# Patient Record
Sex: FEMALE | Race: WHITE
[De-identification: names, ages, dates, MRNs, and addresses within clinical notes are randomized per-mention and may not be internally consistent; named-entity substitution may affect disease eponyms.]

---

## 2020-08-09 ENCOUNTER — HOSPITAL ENCOUNTER (INPATIENT)
Dept: HOSPITAL 74 - FER | Age: 55
LOS: 4 days | Discharge: HOME | DRG: 313 | End: 2020-08-13
Attending: NURSE PRACTITIONER | Admitting: STUDENT IN AN ORGANIZED HEALTH CARE EDUCATION/TRAINING PROGRAM
Payer: COMMERCIAL

## 2020-08-09 VITALS — BODY MASS INDEX: 25.2 KG/M2

## 2020-08-09 DIAGNOSIS — D64.9: ICD-10-CM

## 2020-08-09 DIAGNOSIS — E87.1: ICD-10-CM

## 2020-08-09 DIAGNOSIS — E11.42: ICD-10-CM
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
## 2020-08-09 DIAGNOSIS — E83.42: ICD-10-CM

## 2020-08-09 DIAGNOSIS — F41.8: ICD-10-CM

## 2020-08-09 DIAGNOSIS — E11.9: ICD-10-CM
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
## 2020-08-09 DIAGNOSIS — R55: ICD-10-CM

## 2020-08-09 DIAGNOSIS — I95.1: ICD-10-CM

## 2020-08-09 DIAGNOSIS — I10: ICD-10-CM

## 2020-08-09 DIAGNOSIS — S82.842A: Primary | ICD-10-CM

## 2020-08-09 DIAGNOSIS — E03.9: ICD-10-CM
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
## 2020-08-09 DIAGNOSIS — M06.9: ICD-10-CM

## 2020-08-09 LAB
ALBUMIN SERPL-MCNC: 3.8 G/DL (ref 3.4–5)
ALP SERPL-CCNC: 61 U/L (ref 45–117)
ALT SERPL-CCNC: 14 U/L (ref 13–61)
ANION GAP SERPL CALC-SCNC: 12 MMOL/L (ref 8–16)
AST SERPL-CCNC: 24 U/L (ref 15–37)
BASOPHILS # BLD: 0.6 % (ref 0–2)
BILIRUB SERPL-MCNC: 0.6 MG/DL (ref 0.2–1)
BUN SERPL-MCNC: 20 MG/DL (ref 7–18)
CALCIUM SERPL-MCNC: 9.2 MG/DL (ref 8.5–10)
CHLORIDE SERPL-SCNC: 97 MMOL/L (ref 98–107)
CO2 SERPL-SCNC: 25 MMOL/L (ref 21–32)
CREAT SERPL-MCNC: 1.2 MG/DL (ref 0.55–1.3)
DEPRECATED RDW RBC AUTO: 13 % (ref 11.6–15.6)
EOSINOPHIL # BLD: 4.4 % (ref 0–4.5)
EPITH CASTS URNS QL MICRO: (no result) /HPF
GLUCOSE SERPL-MCNC: 158 MG/DL (ref 74–106)
HCT VFR BLD CALC: 31.1 % (ref 32.4–45.2)
HGB BLD-MCNC: 10.5 GM/DL (ref 10.7–15.3)
HYALINE CASTS URNS QL MICRO: (no result) /LPF
LYMPHOCYTES # BLD: 14.5 % (ref 8–40)
MCH RBC QN AUTO: 32.6 PG (ref 25.7–33.7)
MCHC RBC AUTO-ENTMCNC: 33.7 G/DL (ref 32–36)
MCV RBC: 96.7 FL (ref 80–96)
MONOCYTES # BLD AUTO: 8.1 % (ref 3.8–10.2)
NEUTROPHILS # BLD: 72.4 % (ref 42.8–82.8)
PLATELET # BLD AUTO: 211 K/MM3 (ref 134–434)
PMV BLD: 9.4 FL (ref 7.5–11.1)
POTASSIUM SERPLBLD-SCNC: 4.7 MMOL/L (ref 3.5–5.1)
PROT SERPL-MCNC: 6.7 G/DL (ref 6.4–8.2)
RBC # BLD AUTO: 3.22 M/MM3 (ref 3.6–5.2)
SODIUM SERPL-SCNC: 134 MMOL/L (ref 136–145)
WBC # BLD AUTO: 5.9 K/MM3 (ref 4–10.8)

## 2020-08-09 PROCEDURE — U0003 INFECTIOUS AGENT DETECTION BY NUCLEIC ACID (DNA OR RNA); SEVERE ACUTE RESPIRATORY SYNDROME CORONAVIRUS 2 (SARS-COV-2) (CORONAVIRUS DISEASE [COVID-19]), AMPLIFIED PROBE TECHNIQUE, MAKING USE OF HIGH THROUGHPUT TECHNOLOGIES AS DESCRIBED BY CMS-2020-01-R: HCPCS

## 2020-08-09 RX ADMIN — ACETAMINOPHEN PRN MG: 10 INJECTION, SOLUTION INTRAVENOUS at 21:33

## 2020-08-09 RX ADMIN — SERTRALINE HYDROCHLORIDE SCH MG: 50 TABLET ORAL at 21:19

## 2020-08-09 RX ADMIN — TRAZODONE HYDROCHLORIDE SCH MG: 50 TABLET ORAL at 21:18

## 2020-08-09 RX ADMIN — INSULIN ASPART SCH: 100 INJECTION, SOLUTION INTRAVENOUS; SUBCUTANEOUS at 21:33

## 2020-08-09 NOTE — HP
CHIEF COMPLAINT: Fall/Lightheadedness


PCP:





HISTORY OF PRESENT ILLNESS:


   53yo F with h/o anxiety/depression, T1DM, RA (weekly methotrexate) who 

presented via EMS due to fall. Patient reports feeling lightheaded while 

standing and felt her legs give out. She did not lose consciousness. She fell 

while on the way to the bathroom in the hallway. Patient could not stand and 

called EMS. 3 weeks prior patient was started on GLP-1 agonist for her DM and 

noted she has been lightheaded since this new medication was started. Pt's 

glucose here is 157. She reports she takes her glucose levels regularly with her

lowest reading in the past weeks as 70. Pt told her endocrinologist, Dr. Estrella, 

about her symptoms and reports that the endocrinologist was about to hold her 

next few doses of medications. Patient takes ASA regularly for which she took 

yesterday. 





Currently pt denies any fever/chills, headaches, SOB, CP, palpitations, 

abdominal pain, dysuria, polyuria, hematuria, hematochezia, diarrhea, n/v, 

numbness or sensation changes in lower extremities. Pt endorses loss of appetite

with new medication. Drinks water regularly





FamHx: DM; noncontributory





PAST MEDICAL HISTORY:


   As above





PAST SURGICAL HISTORY:


   Tonsillectomy





Social History:


Smoking: Denies


Alcohol: Denies


Drugs: Denies





Allergies





No Known Allergies Allergy (Unverified 08/09/20 15:14)


   








HOME MEDICATIONS:


                                Home Medications











 Medication  Instructions  Recorded


 


Aripiprazole [Abilify] 5 mg PO DAILY 08/09/20


 


Aspirin Coated [Ecotrin -] 81 mg PO DAILY 08/09/20


 


Bupropion HCl [Wellbutrin Xl -] 150 mg PO HS 08/09/20


 


Folic Acid 1 mg PO DAILY 08/09/20


 


Insulin (LOG) Aspart [NovoLOG -] 0 units SQ QID 08/09/20


 


Insulin Pump [Insulin Pump - (Nf)] 1 each SQ ASDIR 08/09/20


 


Levothyroxine [Synthroid -] 112 mcg PO DAILY 08/09/20


 


Lisinopril [Zestril] 2.5 mg PO DAILY 08/09/20


 


Methotrexate Sodium/Pf 25 mg IJ WEEKLY 08/09/20





[Methotrexate 250 mg/10 ml Vial]  


 


Metoprolol Succinate [Toprol Xl] 50 mg PO DAILY 08/09/20


 


Semaglutide [Ozempic] 0.5 mg SQ WEEKLY 08/09/20


 


Sertraline HCl [Zoloft] 200 mg PO HS 08/09/20


 


Simvastatin 40 mg PO HS 08/09/20


 


Trazodone HCl 75 mg PO HS 08/09/20


 


traZODone HCL [Trazodone HCl] 75 mg PO HS 08/09/20








REVIEW OF SYSTEMS


As per HPI








PHYSICAL EXAMINATION


                               Vital Signs - 24 hr











  08/09/20





  15:06


 


Temperature 98.3 F


 


Pulse Rate 87


 


Respiratory 15





Rate 


 


Blood Pressure 106/72


 


O2 Sat by Pulse 96





Oximetry (%) 











GENERAL: Awake, alert, and fully oriented, in no acute distress.


HEENT: Nc/AT, EOMI, SAJAN, sclera anicteric, MMM


NECK: No JVD, no TTP to C-spine


LUNGS: CTA bilaterally. No wheezes, and no crackles. No accessory muscle use.


HEART: RRR, normal S1 and S2 without murmur


ABDOMEN: Soft, NT/ND, normoactive bowel sounds, no guarding, 


MUSCULOSKELETAL: No gross deformity of ankle. In air splint. Toe ROM intact with

limitation due to pain. No knee effusions


EXTREMITIES: 2+ DP pulses b/l, warm, well-perfused. No calf tenderness. R ankle 

edema.


NEUROLOGICAL:  Cranial nerves II-XII intact. Normal speech. Sensation intact in 

R foot in all areas


PSYCHIATRIC: Cooperative. Good eye contact. Appropriate mood and affect.


SKIN: Warm, dry, no rashes or lesions noted 


                         Laboratory Results - last 24 hr











  08/09/20 08/09/20 08/09/20





  15:50 15:50 15:50


 


WBC  5.9  


 


RBC  3.22 L  


 


Hgb  10.5 L  


 


Hct  31.1 L  


 


MCV  96.7 H  


 


MCH  32.6  


 


MCHC  33.7  


 


RDW  13.0  


 


Plt Count  211  


 


MPV  9.4  


 


Absolute Neuts (auto)  4.2  


 


Neutrophils %  72.4  


 


Lymphocytes %  14.5  


 


Monocytes %  8.1  


 


Eosinophils %  4.4  


 


Basophils %  0.6  


 


Sodium   134 L 


 


Potassium   4.7 


 


Chloride   97 L 


 


Carbon Dioxide   25 


 


Anion Gap   12 


 


BUN   20.0 H 


 


Creatinine   1.2 


 


Est GFR (CKD-EPI)AfAm   58.51 


 


Est GFR (CKD-EPI)NonAf   50.48 


 


Random Glucose   158 H 


 


Calcium   9.2 


 


Total Bilirubin   0.6 


 


AST   24 


 


ALT   14 


 


Alkaline Phosphatase   61 


 


Creatine Kinase   71 


 


Troponin I    < 0.03


 


Total Protein   6.7 


 


Albumin   3.8 


 


Urine Color   


 


Urine Appearance   


 


Urine pH   


 


Urine Protein   


 


Urine Glucose (UA)   


 


Urine Ketones   


 


Urine Blood   


 


Urine Nitrite   


 


Urine Bilirubin   


 


Urine Urobilinogen   


 


Ur Leukocyte Esterase   














  08/09/20





  17:20


 


WBC 


 


RBC 


 


Hgb 


 


Hct 


 


MCV 


 


MCH 


 


MCHC 


 


RDW 


 


Plt Count 


 


MPV 


 


Absolute Neuts (auto) 


 


Neutrophils % 


 


Lymphocytes % 


 


Monocytes % 


 


Eosinophils % 


 


Basophils % 


 


Sodium 


 


Potassium 


 


Chloride 


 


Carbon Dioxide 


 


Anion Gap 


 


BUN 


 


Creatinine 


 


Est GFR (CKD-EPI)AfAm 


 


Est GFR (CKD-EPI)NonAf 


 


Random Glucose 


 


Calcium 


 


Total Bilirubin 


 


AST 


 


ALT 


 


Alkaline Phosphatase 


 


Creatine Kinase 


 


Troponin I 


 


Total Protein 


 


Albumin 


 


Urine Color  Yellow


 


Urine Appearance  Clear


 


Urine pH  6.5


 


Urine Protein  Negative


 


Urine Glucose (UA)  Negative


 


Urine Ketones  Negative


 


Urine Blood  Negative


 


Urine Nitrite  Negative


 


Urine Bilirubin  Negative


 


Urine Urobilinogen  0.2


 


Ur Leukocyte Esterase  Trace H











ASSESSMENT/PLAN:


Syncope vs. Pre-syncope


L Bimaleollar ankle fracture


T1DM


Hypothyroidism


CAD without intervention





--Likely presyncope related to medication


   --Suspect patient is becoming hypoglycemic during these episodes


--Telemetry monitoring for aberrant rhythm


--BGM ACHS with ISS coverage; patient may still be under effects of weekly GLP-1


   --D50 amp PRN for hypoglycemia


--Dr. Rebolledo consulted in ED


   --Probably surgery, but will come to assess


--Type and screen, coags for AM


--Tylenol and morphine for PRN pain control





--Continue home medications as below:


Aripiprazole (Abilify)  5 mg PO DAILY MANISH


Levothyroxine Sodium (Synthroid -)  112 mcg PO ACBK MANISH


Lisinopril (Prinivil)  2.5 mg PO DAILY MANISH


Metoprolol Succinate (Toprol Xl -)  50 mg PO DAILY MANISH


Non-Formulary Medication (Sertraline Hcl [Zoloft])  200 mg PO HS MANISH


Trazodone HCl (Desyrel -)  75 mg PO HS MANISH





diet: Diabetic/sodium


PPX: SCD only R LEG





Dispo: Admit M/S


Isauro Mcclendon, DO - IM











Visit type





- Emergency Visit


Emergency Visit: Yes


ED Registration Date: 08/09/20


Care time: The patient presented to the Emergency Department on the above date 

and was hospitalized for further evaluation of their emergent condition.





- New Patient


This patient is new to me today: Yes


Date on this admission: 08/09/20





- Critical Care


Critical Care patient: No

## 2020-08-09 NOTE — PDOC
Documentation entered by Salo Camacho SCRIBE, acting as scribe for 

Pablito Gonzalez MD.








Pablito Gonzalez MD:  This documentation has been prepared by the Doug purcell Angel, SCRIBE, under my direction and personally reviewed by me in its 

entirety.  I confirm that the documentation accurately reflects all work, 

treatment, procedures, and medical decision making performed by me.  





History of Present Illness





- General


Chief Complaint: Injury


Stated Complaint: L ANKLE INJURY, LIGHHEADED


Time Seen by Provider: 08/09/20 15:12


History Source: Patient


Exam Limitations: No Limitations





- History of Present Illness


Initial Comments: 





08/09/20 15:33


The patient is a 54 year old female with a significant past medical history of 

anxiety, depression, diabetes (insulin dependant), and rheumatoid arthritis who 

presents to the ED BIBA s/p fall. The patient states she stood up from a sitting

position and began feeling lightheaded on her way to the bathroom. Eventually 

her legs gave out and she fell in the hallway of her house. The patient states 3

weeks ago she began taking a new medication for her diabetes which she takes 

once a week called Ozempic and her lightheadedness began a week after starting 

the medication. The patient comes into the ED mainly complaining of left ankle 

pain/swelling. The patient denies any head trauma, LOC or any other injuries. 





Medications: Ozempic once a week injection, Methotrexate once a week injection. 





Allergies: NKDA








08/09/20 17:35


Alert and oriented well-developed well-nourished mild distress due to left ankle

pain cooperative


Afebrile, vital signs stable


HEENT normal


Neck supple without bruit mass or nodes.  No tenderness or deformity.  Full 

range of motion without pain


Chest clear, full breath sounds bilaterally, no rib cage or chest wall deformity

or tenderness


CV S1-S2 normal without murmur rub or gallop.  Regular 80


Abdomen soft nontender without mass organomegaly.  No CVAT


Neurological C2 to 12 intact.  Strength full and symmetric.  No focal 

sensorimotor deficits.  Unable to ambulate due to ankle pain


No vertigo with head movement or change of position.  No nystagmus


Left ankle: Mild to moderate swelling both medially and laterally.  Point 

tenderness both medial and lateral malleoli.  No significant deformity.  No 

instability.


Left foot: Pulses are not palpable in either foot, though both posterior tibial 

pulses are well heard with Doppler.  No distal sensory deficits.  No diabetic 

changes.





Impression: Bimalleolar ankle fracture, dizziness with questionable syncope that

seems to be temporally related to beginning her new diabetes medication 3 weeks 

ago.





Plan: CT is negative for acute neurological event.  EKG and enzymes show no 

evidence of acute cardiac event.  Glucose is controlled at 158.  Definitive 

treatment of the fracture may require ORIF.  Hospitalist and orthopedic surgeon 

has been contacted.  Patient remains clinically and hemodynamically stable.





Past History





- Medical History


Allergies/Adverse Reactions: 


                                    Allergies











Allergy/AdvReac Type Severity Reaction Status Date / Time


 


No Known Allergies Allergy   Unverified 08/09/20 15:14











Home Medications: 


Ambulatory Orders





Abilify PO DAILY 08/09/20 


Bupropion HCl [Wellbutrin Xl -] 150 mg PO HS 08/09/20 


Folic Acid - PO DAILY 08/09/20 


Insulin (LOG) Aspart [NovoLOG -] 0 units SQ QID 08/09/20 


Insulin Pump [Insulin Pump - (Nf)] 1 each SQ ASDIR 08/09/20 


Methotrexate WEEKLY 08/09/20 


Metoprolol Succinate [Toprol Xl] 50 mg PO DAILY 08/09/20 


Ozempic WEEKLY 08/09/20 


Sertraline HCl [Zoloft] 100 mg PO HS 08/09/20 


Simvastatin 20 mg PO HS 08/09/20 


traZODone HCL [Trazodone HCl] 75 mg PO HS 08/09/20 








COPD: No


Diabetes: Yes


HTN: Yes


Hypercholesterolemia: Yes


Psychiatric Problems: Yes





- Psycho-Social/Smoking History


Smoking History: Never smoked





- Substance Abuse Hx (Audit-C & DAST Scrn)


How often the patient has a drink containing alcohol: Never


Score: In Men: 4 or > Positive; In Women: 3 or > Positive: 0


Screen Result (Pos requires Nsg. Audit-10AR): Negative


In the last yr the pt used illegal drug/Rx for NonMed reason: No


Score:  Yes response is considered Positive: 0


Screen Result (Positive result requires Nsg. DAST-10): Negative





**Review of Systems





- Review of Systems


Able to Perform ROS?: Yes


Comments:: 





08/09/20 15:33


GENERAL/CONSTITUTIONAL: No fever or chills. No weakness.


HEAD, EYES, EARS, NOSE AND THROAT: No change in vision. No ear pain or 

discharge. No sore throat.


CARDIOVASCULAR: No chest pain or shortness of breath.


RESPIRATORY: No cough, wheezing, or hemoptysis.


GASTROINTESTINAL: No nausea, vomiting, diarrhea or constipation.


GENITOURINARY: No dysuria, frequency, or change in urination.


MUSCULOSKELETAL: +Left ankle pain/swelling. No neck or back pain.


SKIN: No rash


NEUROLOGIC: +Lightheadedness. No loss of consciousness, or change in 

strength/sensation.


ENDOCRINE: No increased thirst. No abnormal weight change.


HEMATOLOGIC/LYMPHATIC: No anemia, easy bleeding, or history of blood clots.


ALLERGIC/IMMUNOLOGIC: No hives or skin allergy.








*Physical Exam





- Vital Signs


                                Last Vital Signs











Temp Pulse Resp BP Pulse Ox


 


 98.3 F   87   15   106/72   96 


 


 08/09/20 15:06  08/09/20 15:06  08/09/20 15:06  08/09/20 15:06  08/09/20 15:06














ED Treatment Course





- LABORATORY


CBC & Chemistry Diagram: 


                                 08/09/20 15:50





                                 08/09/20 15:50





Medical Decision Making





- Medical Decision Making





08/09/20 15:48


EKG shows normal sinus rhythm rate 82/min.  Normal axis and intervals.  Some 

artifact is present in the lateral leads, but there appears to be no ST-T wave 

changes suggestive of ischemia.  Normal EKG.


08/09/20 15:49


CT of the brain shows no acute hemorrhage, stroke, or other significant 

abnormality.  There is an area of what appears to be chronic encephalomalacia.


08/09/20 17:31


X-ray of the left ankle was reviewed.  There is an oblique fracture of the 

distal fibula as well as a minimally displaced fracture of the medial malleolus.

 Also visualized is a possible small avulsion from the talus, and a questionable

disruption of the cortex of the posterior malleolus.





Dr. Rebolledo was contacted by phone.  The fracture was described.  He recommended 

splinting ice and elevation.  He will consult on the patient in the hospital.  

Surgery may be required.





Laboratory evaluation did not show any significant abnormalities.  Glucose was 

158.  There were no electrolyte abnormalities.  Cardiac enzymes were negative.


08/09/20 17:34


[facility] hospitalist was contacted for admission for further work-up of 

dizziness/syncope and definitive treatment of the ankle injury.  Dr. Mcclendon 

accepts the patient for admission.


08/09/20 17:35


A stirrup splint was applied to the ankle, the patient being more comfortable.  

There was no distal numbness tingling or pain.  Pulses remained intact with 

Doppler.





Discharge





- Discharge Information


Problems reviewed: Yes


Clinical Impression/Diagnosis: 


Syncope


Qualifiers:


 Syncope type: unspecified Qualified Code(s): R55 - Syncope and collapse





Bimalleolar fracture of left ankle


Qualifiers:


 Encounter type: initial encounter Fracture type: closed Qualified Code(s): 

S82.842A - Displaced bimalleolar fracture of left lower leg, initial encounter 

for closed fracture





Condition: Good





- Admission


Yes





- Follow up/Referral


Referrals: 


Briana Claros MD [Primary Care Provider] - 





- Patient Discharge Instructions





- Post Discharge Activity

## 2020-08-09 NOTE — PDOC
History of Present Illness





- General


Chief Complaint: Injury


Stated Complaint: L ANKLE INJURY, LIGHHEADED


Time Seen by Provider: 08/09/20 15:12





Discharge





- Discharge Information


Condition: Good





- Follow up/Referral


Referrals: 


Briana Claros MD [Primary Care Provider] - 





- Patient Discharge Instructions





- Post Discharge Activity

## 2020-08-10 LAB
ANION GAP SERPL CALC-SCNC: 10 MMOL/L (ref 8–16)
BUN SERPL-MCNC: 16 MG/DL (ref 7–18)
CALCIUM SERPL-MCNC: 8.7 MG/DL (ref 8.5–10)
CHLORIDE SERPL-SCNC: 96 MMOL/L (ref 98–107)
CO2 SERPL-SCNC: 24 MMOL/L (ref 21–32)
CREAT SERPL-MCNC: 1.1 MG/DL (ref 0.55–1.3)
DEPRECATED RDW RBC AUTO: 12.7 % (ref 11.6–15.6)
GLUCOSE SERPL-MCNC: 307 MG/DL (ref 74–106)
HCT VFR BLD CALC: 29.3 % (ref 32.4–45.2)
HGB BLD-MCNC: 9.5 GM/DL (ref 10.7–15.3)
INR BLD: 1.17 (ref 0.82–1.09)
MCH RBC QN AUTO: 32.4 PG (ref 25.7–33.7)
MCHC RBC AUTO-ENTMCNC: 32.5 G/DL (ref 32–36)
MCV RBC: 99.6 FL (ref 80–96)
PLATELET # BLD AUTO: 184 K/MM3 (ref 134–434)
PMV BLD: 9.7 FL (ref 7.5–11.1)
POTASSIUM SERPLBLD-SCNC: 4.6 MMOL/L (ref 3.5–5.1)
PT PNL PPP: 13.1 SEC (ref 10.2–13)
RBC # BLD AUTO: 2.94 M/MM3 (ref 3.6–5.2)
SODIUM SERPL-SCNC: 130 MMOL/L (ref 136–145)
WBC # BLD AUTO: 5.5 K/MM3 (ref 4–10.8)

## 2020-08-10 RX ADMIN — LISINOPRIL SCH MG: 5 TABLET ORAL at 10:45

## 2020-08-10 RX ADMIN — INSULIN ASPART SCH: 100 INJECTION, SOLUTION INTRAVENOUS; SUBCUTANEOUS at 06:34

## 2020-08-10 RX ADMIN — FOLIC ACID SCH MG: 1 TABLET ORAL at 17:58

## 2020-08-10 RX ADMIN — TRAZODONE HYDROCHLORIDE SCH MG: 50 TABLET ORAL at 21:34

## 2020-08-10 RX ADMIN — SERTRALINE HYDROCHLORIDE SCH MG: 50 TABLET ORAL at 21:34

## 2020-08-10 RX ADMIN — MORPHINE SULFATE PRN MG: 2 INJECTION, SOLUTION INTRAMUSCULAR; INTRAVENOUS at 21:35

## 2020-08-10 RX ADMIN — INSULIN ASPART SCH: 100 INJECTION, SOLUTION INTRAVENOUS; SUBCUTANEOUS at 22:54

## 2020-08-10 RX ADMIN — LEVOTHYROXINE SODIUM SCH MCG: 112 TABLET ORAL at 06:35

## 2020-08-10 RX ADMIN — INSULIN ASPART SCH: 100 INJECTION, SOLUTION INTRAVENOUS; SUBCUTANEOUS at 17:51

## 2020-08-10 RX ADMIN — INSULIN ASPART SCH: 100 INJECTION, SOLUTION INTRAVENOUS; SUBCUTANEOUS at 11:43

## 2020-08-10 RX ADMIN — ACETAMINOPHEN PRN MG: 10 INJECTION, SOLUTION INTRAVENOUS at 05:49

## 2020-08-10 RX ADMIN — ATORVASTATIN CALCIUM SCH MG: 20 TABLET, FILM COATED ORAL at 21:34

## 2020-08-10 NOTE — EKG
Test Reason : 

Blood Pressure : ***/*** mmHG

Vent. Rate : 082 BPM     Atrial Rate : 082 BPM

   P-R Int : 180 ms          QRS Dur : 094 ms

    QT Int : 346 ms       P-R-T Axes : 052 041 026 degrees

   QTc Int : 404 ms

 

NORMAL SINUS RHYTHM

NORMAL ECG

NO PREVIOUS ECGS AVAILABLE

Confirmed by Jorge Earl ([id number]) on 8/10/2020 9:22:02 AM

 

Referred By:             Confirmed By:Jorge Earl

## 2020-08-10 NOTE — PN
Physical Exam: 


SUBJECTIVE: Patient seen and examined. Pain in left foot and ankle, gets good 

relief with morphine.  








OBJECTIVE:





                                   Vital Signs











 Period  Temp  Pulse  Resp  BP Sys/Elias  Pulse Ox


 


 Last 24 Hr  98 F-99.1 F  83-96  16-20  /45-67  94-99











GENERAL: The patient is awake, alert, and fully oriented, in no acute distress.


LUNGS: Breath sounds equal, clear to auscultation bilaterally, no wheezes, no 

crackles, no 


accessory muscle use. 


HEART: Regular rate and rhythm, S1, S2 


ABDOMEN: Soft, nontender, nondistended,


LLE: foot in stirrup device, + pulses, warm, well-perfused, + edema


NEUROLOGICAL: Cranial nerves II through XII grossly intact. Normal speech, gait 

not observed.














                         Laboratory Results - last 24 hr











  08/09/20 08/09/20 08/09/20





  17:00 17:20 21:16


 


WBC   


 


RBC   


 


Hgb   


 


Hct   


 


MCV   


 


MCH   


 


MCHC   


 


RDW   


 


Plt Count   


 


MPV   


 


PT with INR  Cancelled  


 


INR  Cancelled  


 


Sodium   


 


Potassium   


 


Chloride   


 


Carbon Dioxide   


 


Anion Gap   


 


BUN   


 


Creatinine   


 


Est GFR (CKD-EPI)AfAm   


 


Est GFR (CKD-EPI)NonAf   


 


POC Glucometer    160


 


Random Glucose   


 


Calcium   


 


Urine Color   Yellow 


 


Urine Appearance   Clear 


 


Urine pH   6.5 


 


Urine Protein   Negative 


 


Urine Glucose (UA)   Negative 


 


Urine Ketones   Negative 


 


Urine Blood   Negative 


 


Urine Nitrite   Negative 


 


Urine Bilirubin   Negative 


 


Urine Urobilinogen   0.2 


 


Ur Leukocyte Esterase   Trace H 


 


Urine RBC   0-2 


 


Urine WBC   2-5 


 


Ur Transition Epith Cell   Few 


 


Hyaline Casts   1-3 


 


Blood Type   


 


Antibody Screen   














  08/10/20 08/10/20 08/10/20





  06:00 06:00 06:25


 


WBC   


 


RBC   


 


Hgb   


 


Hct   


 


MCV   


 


MCH   


 


MCHC   


 


RDW   


 


Plt Count   


 


MPV   


 


PT with INR   13.1 H 


 


INR   1.17 


 


Sodium  130 L  


 


Potassium  4.6  


 


Chloride  96 L  


 


Carbon Dioxide  24  


 


Anion Gap  10  


 


BUN  16.0  


 


Creatinine  1.1  


 


Est GFR (CKD-EPI)AfAm  65.00  


 


Est GFR (CKD-EPI)NonAf  56.08  


 


POC Glucometer    345


 


Random Glucose  307 H  


 


Calcium  8.7  


 


Urine Color   


 


Urine Appearance   


 


Urine pH   


 


Urine Protein   


 


Urine Glucose (UA)   


 


Urine Ketones   


 


Urine Blood   


 


Urine Nitrite   


 


Urine Bilirubin   


 


Urine Urobilinogen   


 


Ur Leukocyte Esterase   


 


Urine RBC   


 


Urine WBC   


 


Ur Transition Epith Cell   


 


Hyaline Casts   


 


Blood Type   


 


Antibody Screen   














  08/10/20 08/10/20





  07:14 07:17


 


WBC   5.5


 


RBC   2.94 L


 


Hgb   9.5 L


 


Hct   29.3 L


 


MCV   99.6 H


 


MCH   32.4


 


MCHC   32.5


 


RDW   12.7


 


Plt Count   184


 


MPV   9.7


 


PT with INR  


 


INR  


 


Sodium  


 


Potassium  


 


Chloride  


 


Carbon Dioxide  


 


Anion Gap  


 


BUN  


 


Creatinine  


 


Est GFR (CKD-EPI)AfAm  


 


Est GFR (CKD-EPI)NonAf  


 


POC Glucometer  


 


Random Glucose  


 


Calcium  


 


Urine Color  


 


Urine Appearance  


 


Urine pH  


 


Urine Protein  


 


Urine Glucose (UA)  


 


Urine Ketones  


 


Urine Blood  


 


Urine Nitrite  


 


Urine Bilirubin  


 


Urine Urobilinogen  


 


Ur Leukocyte Esterase  


 


Urine RBC  


 


Urine WBC  


 


Ur Transition Epith Cell  


 


Hyaline Casts  


 


Blood Type  A POSITIVE 


 


Antibody Screen  Negative 








                           Active Medications











Generic Name Dose Route Start Last Admin





  Trade Name Freq  PRN Reason Stop Dose Admin


 


Acetaminophen  1,000 mg  08/09/20 17:55  08/10/20 05:49





  Ofirmev Injection -  IVPB  08/10/20 17:55  1,000 mg





  Q6H PRN   Administration





  PAIN LEVEL 4 - 6  


 


Aripiprazole  5 mg  08/10/20 10:00  08/10/20 10:45





  Abilify  PO   5 mg





  DAILY MANISH   Administration


 


Dextrose  25 gm  08/09/20 18:26 





  D50w (Vial) -  IVPUSH  





  PRN PRN  





  HYPOGLYCEMIA  


 


Insulin Aspart  1 vial  08/09/20 22:00  08/10/20 11:43





  Novolog Vial Sliding Scale -  SQ   Not Given





  ACHS MANISH  





  Protocol  


 


Levothyroxine Sodium  112 mcg  08/10/20 07:00  08/10/20 06:35





  Synthroid -  PO   112 mcg





  ACBK MANISH   Administration


 


Lisinopril  2.5 mg  08/10/20 10:00  08/10/20 10:45





  Prinivil  PO   2.5 mg





  DAILY MANISH   Administration


 


Metoprolol Succinate  50 mg  08/10/20 10:00  08/10/20 10:46





  Toprol Xl -  PO   50 mg





  DAILY MANISH   Administration


 


Morphine Sulfate  2 mg  08/09/20 17:55 





  Morphine Sulfate  IVPUSH  





  Q4H PRN  





  PAIN LEVEL 7 - 10  


 


Sertraline HCl  200 mg  08/09/20 22:00  08/09/20 21:19





  Zoloft -  PO   200 mg





  HS MANISH   Administration


 


Trazodone HCl  75 mg  08/09/20 22:00  08/09/20 21:18





  Desyrel -  PO   75 mg





  HS MANISH   Administration





PCP: Dr. Claros


Endocrine: Dr. Pereira 


Rheumatology: Dr. Chaidez/


Cardiology: Dr. Reynoso





ADDITIONAL PMH


Hypertension


h/o rapid heart beat


Head trauma (sledding accident in 4th grade)








ASSESSMENT/PLAN:


The patient is a 54 year-old female with a PMH significant for HTN, cardiac 

arrhythmia, Type II IDDM, rheumatoid arthritis, remote head trauma, anxiety and 

depression. Admitted for left bimalleolar fracture s/p fall. 





Syncope


   --in the last 10 days has fallen 3 times; (+) prodromal symptoms of 

unsteadiness and dizziness immediately prior to each fall; on most recent fall, 

may have had LOC prior to falling


   --remote history of head trauma as a child


   --h/o "rapid heart beat" for which she is prescribed ToprolXL


   --fingersticks regularly, no episodes of hypoglycemia over the last 10 days


   --troponin neg x 1; second pending


   --telemetry monitoring


   --cardiology consult


   --neuro consult





Left bimalleolar fracture


   --oblique fracture of the distal fibula as well as a minimally displaced 

fracture of the medial malleolus.  Also visualized is a possible small avulsion 

from the talus, and a questionable disruption of the cortex of the posterior 

malleolus


   --plan is for surgical repair pending cardiac clearance and COVID results


   --IV tylenol and morphine PRN for pain


   


Hypertension


   --BP on low side


   --gentle IV fluids





Cardiac arrhythmia


   --has been on ToprolXL "for years" cannot be more specific than a h/o rapid 

heart beat years ago


   --cardiology consult placed


   --telemetry monitoring





Type II IDDM


   --functioning Humalog insulin pump


   --nursing staff to continue fingersticks and to cover with Novolog per 

sliding scale


   --A1C ordered





Rheumatoid arthritis


   --methotrexate weekly





FEN


   Fluids: NS@50mL/hr


   Electrolytes: replete as indicated


   Nutrition: low sodium; NPO after midnight





DVT prophylaxis: SCDs





Dispo: continues to require inpatient care. Full code.








Visit type





- Emergency Visit


Emergency Visit: Yes


ED Registration Date: 08/09/20


Care time: The patient presented to the Emergency Department on the above date 

and was hospitalized for further evaluation of their emergent condition.





- New Patient


This patient is new to me today: Yes


Date on this admission: 08/11/20





- Critical Care


Critical Care patient: No

## 2020-08-10 NOTE — CON.ORTH
Consult


Reason for Consultation:: left ankle fx





- Past Medical History


...Pregnant: No





- Smoking History


Smoking history: Never smoked





Home Medications





- Allergies


Allergies/Adverse Reactions: 


                                    Allergies











Allergy/AdvReac Type Severity Reaction Status Date / Time


 


No Known Allergies Allergy   Unverified 08/09/20 15:14














- Home Medications


Home Medications: 


Ambulatory Orders





Aripiprazole [Abilify] 5 mg PO DAILY 08/09/20 


Aspirin Coated [Ecotrin -] 81 mg PO DAILY 08/09/20 


Bupropion HCl [Wellbutrin Xl -] 150 mg PO HS 08/09/20 


Folic Acid 1 mg PO DAILY 08/09/20 


Insulin (LOG) Aspart [NovoLOG -] 0 units SQ QID 08/09/20 


Insulin Pump [Insulin Pump - (Nf)] 1 each SQ ASDIR 08/09/20 


Levothyroxine [Synthroid -] 112 mcg PO DAILY 08/09/20 


Lisinopril [Zestril] 2.5 mg PO DAILY 08/09/20 


Methotrexate Sodium/Pf [Methotrexate 250 mg/10 ml Vial] 25 mg IJ WEEKLY 08/09/20




Metoprolol Succinate [Toprol Xl] 50 mg PO DAILY 08/09/20 


Semaglutide [Ozempic] 0.5 mg SQ WEEKLY 08/09/20 


Sertraline HCl [Zoloft] 200 mg PO HS 08/09/20 


Simvastatin 40 mg PO HS 08/09/20 


Trazodone HCl 75 mg PO HS 08/09/20 


traZODone HCL [Trazodone HCl] 75 mg PO HS 08/09/20 











Physical Exam for Ortho


Vital Signs: 


                                   Vital Signs











Temperature  99.1 F   08/10/20 08:00


 


Pulse Rate  91 H  08/10/20 08:00


 


Respiratory Rate  20   08/10/20 08:00


 


Blood Pressure  97/61   08/10/20 08:00


 


O2 Sat by Pulse Oximetry (%)  99   08/10/20 08:00











Labs: 


                                    CBC, BMP





                                 08/10/20 07:17 





                                 08/10/20 06:00 





                                    INR, PTT











INR  1.17  (0.82-1.09)   08/10/20  06:00    














- Lower Extremity


Ankle: Yes: Left, Limited ROM, Pain, Swelling, Tenderness, Other (+ swelling, + 

ttp medial and lateral mal, decr rom, nvi)





Imaging





- Results


X-ray: Image Reviewed





Assessment/Plan





55yo F with h/o anxiety/depression, T1DM, RA (weekly methotrexate) who presented

via EMS due to fall. Patient reports feeling lightheaded while standing and felt

her legs give out. Pt sustained left malika ankle fx.





a/p left displaced malika fx


Risks and benefits were d/w pt in detail


OR tomorrow for left ankle orif pending clearance and covid testing


surgical clearance


NPo after midnight


strict elevation


d/w Dr. Rebolledo

## 2020-08-11 LAB
ALBUMIN SERPL-MCNC: 3.1 G/DL (ref 3.4–5)
ALP SERPL-CCNC: 57 U/L (ref 45–117)
ALT SERPL-CCNC: 13 U/L (ref 13–61)
ANION GAP SERPL CALC-SCNC: 7 MMOL/L (ref 8–16)
AST SERPL-CCNC: 20 U/L (ref 15–37)
BASOPHILS # BLD: 0.6 % (ref 0–2)
BILIRUB SERPL-MCNC: 0.5 MG/DL (ref 0.2–1)
BUN SERPL-MCNC: 11 MG/DL (ref 7–18)
CALCIUM SERPL-MCNC: 8.8 MG/DL (ref 8.5–10)
CHLORIDE SERPL-SCNC: 100 MMOL/L (ref 98–107)
CO2 SERPL-SCNC: 25 MMOL/L (ref 21–32)
CREAT SERPL-MCNC: 0.9 MG/DL (ref 0.55–1.3)
DEPRECATED RDW RBC AUTO: 12.3 % (ref 11.6–15.6)
EOSINOPHIL # BLD: 4.2 % (ref 0–4.5)
GLUCOSE SERPL-MCNC: 212 MG/DL (ref 74–106)
HCT VFR BLD CALC: 27.1 % (ref 32.4–45.2)
HGB BLD-MCNC: 9.2 GM/DL (ref 10.7–15.3)
LYMPHOCYTES # BLD: 17.4 % (ref 8–40)
MAGNESIUM SERPL-MCNC: 1.6 MG/DL (ref 1.8–2.4)
MCH RBC QN AUTO: 33.1 PG (ref 25.7–33.7)
MCHC RBC AUTO-ENTMCNC: 33.8 G/DL (ref 32–36)
MCV RBC: 97.9 FL (ref 80–96)
MONOCYTES # BLD AUTO: 11.7 % (ref 3.8–10.2)
NEUTROPHILS # BLD: 66.1 % (ref 42.8–82.8)
PLATELET # BLD AUTO: 196 K/MM3 (ref 134–434)
PMV BLD: 9.6 FL (ref 7.5–11.1)
POTASSIUM SERPLBLD-SCNC: 4.3 MMOL/L (ref 3.5–5.1)
PROT SERPL-MCNC: 5.8 G/DL (ref 6.4–8.2)
RBC # BLD AUTO: 2.77 M/MM3 (ref 3.6–5.2)
SODIUM SERPL-SCNC: 132 MMOL/L (ref 136–145)
WBC # BLD AUTO: 5.3 K/MM3 (ref 4–10.8)

## 2020-08-11 PROCEDURE — 0QSK04Z REPOSITION LEFT FIBULA WITH INTERNAL FIXATION DEVICE, OPEN APPROACH: ICD-10-PCS | Performed by: ORTHOPAEDIC SURGERY

## 2020-08-11 RX ADMIN — MORPHINE SULFATE PRN MG: 2 INJECTION, SOLUTION INTRAMUSCULAR; INTRAVENOUS at 01:07

## 2020-08-11 RX ADMIN — LEVOTHYROXINE SODIUM SCH MCG: 112 TABLET ORAL at 06:17

## 2020-08-11 RX ADMIN — SERTRALINE HYDROCHLORIDE SCH MG: 50 TABLET ORAL at 21:41

## 2020-08-11 RX ADMIN — MORPHINE SULFATE PRN MG: 2 INJECTION, SOLUTION INTRAMUSCULAR; INTRAVENOUS at 06:59

## 2020-08-11 RX ADMIN — INSULIN ASPART SCH: 100 INJECTION, SOLUTION INTRAVENOUS; SUBCUTANEOUS at 22:33

## 2020-08-11 RX ADMIN — INSULIN ASPART SCH: 100 INJECTION, SOLUTION INTRAVENOUS; SUBCUTANEOUS at 16:32

## 2020-08-11 RX ADMIN — FOLIC ACID SCH: 1 TABLET ORAL at 10:14

## 2020-08-11 RX ADMIN — LISINOPRIL SCH MG: 5 TABLET ORAL at 10:15

## 2020-08-11 RX ADMIN — INSULIN ASPART SCH: 100 INJECTION, SOLUTION INTRAVENOUS; SUBCUTANEOUS at 12:35

## 2020-08-11 RX ADMIN — CEFAZOLIN SODIUM SCH GM: 2 SOLUTION INTRAVENOUS at 22:39

## 2020-08-11 RX ADMIN — ATORVASTATIN CALCIUM SCH MG: 20 TABLET, FILM COATED ORAL at 21:40

## 2020-08-11 RX ADMIN — INSULIN ASPART SCH: 100 INJECTION, SOLUTION INTRAVENOUS; SUBCUTANEOUS at 06:16

## 2020-08-11 RX ADMIN — TRAZODONE HYDROCHLORIDE SCH MG: 50 TABLET ORAL at 21:40

## 2020-08-11 NOTE — OP
Operative Note





- Note:


Operative Date: 08/11/20 (tasha)


Pre-Operative Diagnosis: left ankle malika fx


Operation: left ankle orif, syndesmosis repair


Post-Operative Diagnosis: Same as Pre-op


Surgeon: Florentin Rebolledo


Assistant: Darron Padron


Anesthesia: General, Local


Estimated Blood Loss (mls): 50

## 2020-08-11 NOTE — PN
Physical Exam: 


SUBJECTIVE: Patient seen and examined








OBJECTIVE:





                                   Vital Signs











 Period  Temp  Pulse  Resp  BP Sys/Elias  Pulse Ox


 


 Last 24 Hr  97.9 F-99.1 F  81-87  18-20  110-139/63-73  











GENERAL: The patient is awake, alert, and fully oriented, in no acute distress.


LUNGS: Breath sounds equal, clear to auscultation bilaterally, no wheezes, no 

crackles, no 


accessory muscle use. 


HEART: Regular rate and rhythm, S1, S2 


ABDOMEN: Soft, nontender, nondistended,


LLE: foot in stirrup device, + pulses, warm, well-perfused, + increased edema


NEUROLOGICAL: Cranial nerves II through XII grossly intact. Normal speech, gait 

not observed.

















                         Laboratory Results - last 24 hr











  08/10/20 08/10/20 08/11/20





  06:00 07:14 07:38


 


WBC    5.3


 


RBC    2.77 L


 


Hgb    9.2 L


 


Hct    27.1 L


 


MCV    97.9 H


 


MCH    33.1


 


MCHC    33.8


 


RDW    12.3


 


Plt Count    196


 


MPV    9.6


 


Absolute Neuts (auto)    3.6


 


Neutrophils %    66.1


 


Lymphocytes %    17.4


 


Monocytes %    11.7 H


 


Eosinophils %    4.2


 


Basophils %    0.6


 


Sodium   


 


Potassium   


 


Chloride   


 


Carbon Dioxide   


 


Anion Gap   


 


BUN   


 


Creatinine   


 


Est GFR (CKD-EPI)AfAm   


 


Est GFR (CKD-EPI)NonAf   


 


Random Glucose   


 


Hemoglobin A1c %   


 


Calcium   


 


Magnesium   


 


Total Bilirubin   


 


AST   


 


ALT   


 


Alkaline Phosphatase   


 


Troponin I  < 0.03  


 


Total Protein   


 


Albumin   


 


TSH   


 


Blood Type   A POSITIVE 


 


Antibody Screen   Negative 














  08/11/20 08/11/20





  07:38 07:38


 


WBC  


 


RBC  


 


Hgb  


 


Hct  


 


MCV  


 


MCH  


 


MCHC  


 


RDW  


 


Plt Count  


 


MPV  


 


Absolute Neuts (auto)  


 


Neutrophils %  


 


Lymphocytes %  


 


Monocytes %  


 


Eosinophils %  


 


Basophils %  


 


Sodium  132 L 


 


Potassium  4.3 


 


Chloride  100 


 


Carbon Dioxide  25 


 


Anion Gap  7 L 


 


BUN  11.0 


 


Creatinine  0.9 


 


Est GFR (CKD-EPI)AfAm  82.84 


 


Est GFR (CKD-EPI)NonAf  71.48 


 


Random Glucose  212 H 


 


Hemoglobin A1c %   7.4 H


 


Calcium  8.8 


 


Magnesium  1.6 L 


 


Total Bilirubin  0.5 


 


AST  20 


 


ALT  13 


 


Alkaline Phosphatase  57 


 


Troponin I  


 


Total Protein  5.8 L 


 


Albumin  3.1 L 


 


TSH  1.33 


 


Blood Type  


 


Antibody Screen  








                           Active Medications











Generic Name Dose Route Start Last Admin





  Trade Name Freq  PRN Reason Stop Dose Admin


 


Aripiprazole  5 mg  08/10/20 10:00  08/10/20 10:45





  Abilify  PO   5 mg





  DAILY MANISH   Administration


 


Atorvastatin Calcium  20 mg  08/10/20 22:00  08/10/20 21:34





  Lipitor -  PO   20 mg





  HS MANISH   Administration


 


Bupropion HCl  150 mg  08/10/20 22:00  08/10/20 21:34





  Wellbutrin Xl -  PO   150 mg





  HS MANISH   Administration


 


Dextrose  25 gm  08/09/20 18:26 





  D50w (Vial) -  IVPUSH  





  PRN PRN  





  HYPOGLYCEMIA  


 


Folic Acid  1 mg  08/10/20 17:30  08/10/20 17:58





  Folic Acid -  PO   1 mg





  DAILY MANISH   Administration


 


Sodium Chloride  1,000 mls @ 50 mls/hr  08/10/20 17:30  08/10/20 17:58





  Normal Saline -  IV  08/11/20 17:24  50 mls/hr





  ASDIR MANISH   Administration


 


Insulin Aspart  1 vial  08/09/20 22:00  08/11/20 06:16





  Novolog Vial Sliding Scale -  SQ   Not Given





  ACHS MANISH  





  Protocol  


 


Levothyroxine Sodium  112 mcg  08/10/20 07:00  08/11/20 06:17





  Synthroid -  PO   112 mcg





  ACBK MANISH   Administration


 


Lisinopril  2.5 mg  08/10/20 10:00  08/10/20 10:45





  Prinivil  PO   2.5 mg





  DAILY MANISH   Administration


 


Metoprolol Succinate  50 mg  08/10/20 10:00  08/10/20 10:46





  Toprol Xl -  PO   50 mg





  DAILY MANISH   Administration


 


Morphine Sulfate  2 mg  08/09/20 17:55  08/11/20 06:59





  Morphine Sulfate  IVPUSH   2 mg





  Q4H PRN   Administration





  PAIN LEVEL 7 - 10  


 


Sertraline HCl  200 mg  08/09/20 22:00  08/10/20 21:34





  Zoloft -  PO   200 mg





  HS MANISH   Administration


 


Trazodone HCl  75 mg  08/09/20 22:00  08/10/20 21:34





  Desyrel -  PO   75 mg





  HS MANISH   Administration




















ASSESSMENT/PLAN:


The patient is a 54 year-old female with a PMH significant for HTN, cardiac 

arrhythmia, Type II IDDM, hypothyroidism, rheumatoid arthritis, remote head 

trauma, anxiety and depression. Admitted for left bimalleolar fracture s/p fall.







Syncope


   --in the last 10 days has fallen 3 times; (+) prodromal symptoms of 

unsteadiness and dizziness immediately prior to each fall; on most recent fall, 

may have had LOC prior to falling


   --remote history of head trauma as a child


   --h/o "rapid heart beat" for which she is prescribed ToprolXL


   --fingersticks regularly, no episodes of hypoglycemia over the last 10 days


   --troponin neg x 2


   --telemetry monitoring: no events


   --cardiology: decrease ToprolXL dose to 25mg


   --neuro consult pending





Left bimalleolar fracture


   --oblique fracture of the distal fibula as well as a minimally displaced 

fracture of the medial malleolus.  Also visualized is a possible small avulsion 

from the talus, and a questionable disruption of the cortex of the posterior 

malleolus


   --to OR this afternoon; cardiac clearance; COVID negative


      


Hypertension


   --BP on low side


   --gentle IV fluids





Cardiac arrhythmia


   --has been on ToprolXL "for years" cannot be more specific than a h/o rapid 

heart beat years ago


   --telemetry monitoring





Type II IDDM


   --HgbA1C 7.4


   --functioning Humalog insulin pump


   --nursing staff to continue fingersticks and to cover with Novolog per 

sliding scale


   --A1C ordered





Hypothyroidism


   --TSH wnl


   --continue levothyroxine





Rheumatoid arthritis


   --methotrexate weekly; daily folic acid





Anxiety/depression


   --continue Abilify, Wellbutrin, trazodone





Hypomagnesemia


   --repleted





FEN


   Fluids: NS@50mL/hr


   Electrolytes: replete as indicated


   Nutrition: NPO pre-op





DVT prophylaxis: SCDs





Dispo: continues to require inpatient care. Full code.





Visit type





- Emergency Visit


Emergency Visit: Yes


ED Registration Date: 08/09/20


Care time: The patient presented to the Emergency Department on the above date 

and was hospitalized for further evaluation of their emergent condition.





- New Patient


This patient is new to me today: No





- Critical Care


Critical Care patient: No

## 2020-08-11 NOTE — CON.CARD
Consult


Consult Specialty:: Cardiology


Referred by:: Hospitalist


Reason for Consultation:: Cardiac evaluation





- History of Present Illness


Chief Complaint: Post fall resulting in left ankle fracture


History of Present Illness: 








Patient is a 54 year old female with underlying history of anxiety/depression, 

Diabetes Mellitus, HTN, hypothyroidism and RA who presented with lightheadedness

resulting in a fall 2 days ago. This resulted in a fracture of the left ankle 

for which she is waiting surgery. She states that she had fallen few times since

the new DM medication (GLP-1 agonist: Ozempic) and previously thinks that she 

had lost consciousness. Patient denies LOC this time. She denies chest pain, 

shortness of breath or palpitations. She denies PND or orthopnea. She denies 

fever or chills. She denies cough or expectorations. She denies nausea, 

vomiting, diarrhea or abdominal pain. She denies headache. 





Cardiologist: Priyank Reynoso MD ([facility])





- History Source


History Provided By: Patient, Medical Record


Limitations to Obtaining History: No Limitations





- Past Medical History


Cardio/Vascular: Yes: HTN


Psych: Yes: Anxiety, Depression


Rheumatology: Yes: Rheumatoid Arthritis


Endocrine: Yes: Diabetes Mellitus





- Past Surgical History


Past Surgical History: Yes: Tonsillectomy





- Alcohol/Substance Use


Hx Alcohol Use: No


History of Substance Use: reports: None





- Smoking History


Smoking history: Never smoked





Home Medications





- Allergies


Allergies/Adverse Reactions: 


                                    Allergies











Allergy/AdvReac Type Severity Reaction Status Date / Time


 


No Known Allergies Allergy   Unverified 08/09/20 15:14














- Home Medications


Home Medications: 


Ambulatory Orders





Aripiprazole [Abilify] 5 mg PO DAILY 08/09/20 


Aspirin Coated [Ecotrin -] 81 mg PO DAILY 08/09/20 


Bupropion HCl [Wellbutrin Xl -] 150 mg PO HS 08/09/20 


Folic Acid 1 mg PO DAILY 08/09/20 


Insulin (LOG) Aspart [NovoLOG -] 0 units SQ QID 08/09/20 


Insulin Pump [Insulin Pump - (Nf)] 1 each SQ ASDIR 08/09/20 


Levothyroxine [Synthroid -] 112 mcg PO DAILY 08/09/20 


Lisinopril [Zestril] 2.5 mg PO DAILY 08/09/20 


Methotrexate Sodium/Pf [Methotrexate 250 mg/10 ml Vial] 25 mg IJ WEEKLY 08/09/20




Metoprolol Succinate [Toprol Xl] 50 mg PO DAILY 08/09/20 


Semaglutide [Ozempic] 0.5 mg SQ WEEKLY 08/09/20 


Sertraline HCl [Zoloft] 200 mg PO HS 08/09/20 


Simvastatin 40 mg PO HS 08/09/20 


Trazodone HCl 75 mg PO HS 08/09/20 


traZODone HCL [Trazodone HCl] 75 mg PO HS 08/09/20 











Family Medical History


Other Family History: Family history of AF, HTN





Review of Systems





- Review of Systems


Constitutional: denies: Chills, Fever


Cardiovascular: denies: Chest Pain, Palpitations, Shortness of Breath


Respiratory: denies: Cough, Hemoptysis, Orthopnea, PND, SOB, SOB on Exertion, 

Wheezing


Gastrointestinal: denies: Abdominal Pain, Constipation, Diarrhea, Melena, 

Nausea, Rectal Bleeding, Vomiting


Genitourinary: denies: Dysuria, Hematuria


Musculoskeletal: denies: Back Pain, Joint Pain


Neurological: reports: Dizziness, Syncope.  denies: Confusion, Headache, 

Numbness, Parasthesia, Seizure, Unsteady Gait, Weakness


Psychiatric: reports: Anxiety, Depression


Vital Signs: 


                                   Vital Signs











Temperature  98.7 F   08/11/20 06:00


 


Pulse Rate  84   08/11/20 06:00


 


Respiratory Rate  18   08/11/20 06:00


 


Blood Pressure  120/63   08/11/20 06:00


 


O2 Sat by Pulse Oximetry (%)  100   08/11/20 08:17











HENT: Yes: Atraumatic


Neck: Yes: Supple


Respiratory: Yes: CTA Bilaterally


Gastrointestinal: Yes: Normal Bowel Sounds, Soft.  No: Tenderness


Cardiovascular: Yes: Regular Rate and Rhythm


JVD: No


Carotid Bruit: No


PMI: Non-Displaced


Heart Sounds: Yes: S1, S2.  No: Gallop


Murmur: No: Systolic Murmur, Diastolic Murmur


Edema: No





- Other Data


Labs, Other Data: 


                                    CBC, BMP





                                 08/11/20 07:38 





                                 08/11/20 07:38 





                                    INR, PTT











INR  1.17  (0.82-1.09)   08/10/20  06:00    








                                  Troponin, BNP











  08/10/20





  06:00


 


Troponin I  < 0.03








                                  Troponin, BNP











  08/10/20





  06:00


 


Troponin I  < 0.03














Imaging





- Results


X-ray: Report Reviewed (XRay: left distal fibula and medial malleolar fracture)


EKG: Report Reviewed





Problem List





- Problems


(1) HTN (hypertension)


Code(s): I10 - ESSENTIAL (PRIMARY) HYPERTENSION   





(2) Diabetes mellitus


Code(s): E11.9 - TYPE 2 DIABETES MELLITUS WITHOUT COMPLICATIONS   


Qualifiers: 


   Diabetes mellitus type: type 1   Diabetes mellitus complication status: wit

hout complication   Qualified Code(s): E10.9 - Type 1 diabetes mellitus without 

complications   





(3) Rheumatoid arthritis


Code(s): M06.9 - RHEUMATOID ARTHRITIS, UNSPECIFIED   





(4) Hypothyroidism


Code(s): E03.9 - HYPOTHYROIDISM, UNSPECIFIED   





(5) Anxiety


Code(s): F41.9 - ANXIETY DISORDER, UNSPECIFIED   





(6) Depression


Code(s): F32.9 - MAJOR DEPRESSIVE DISORDER, SINGLE EPISODE, UNSPECIFIED   





(7) Bimalleolar fracture of left ankle


Code(s): S82.842A - DISPLACED BIMALLEOLAR FRACTURE OF LEFT LOWER LEG, INIT   


Qualifiers: 


   Encounter type: initial encounter   Fracture type: closed   Qualified 

Code(s): S82.842A - Displaced bimalleolar fracture of left lower leg, initial 

encounter for closed fracture   





(8) Syncope


Code(s): R55 - SYNCOPE AND COLLAPSE   


Qualifiers: 


   Syncope type: unspecified   Qualified Code(s): R55 - Syncope and collapse   





(9) Preop cardiovascular exam


Code(s): Z01.810 - ENCOUNTER FOR PREPROCEDURAL CARDIOVASCULAR EXAMINATION   





Assessment/Plan





1.  Post fall resulting in left ankle fracture


2.  ? Near syncope possible due to hypoglycemia


3.  Hyponatremia


4.  HTN


5.  Insulin requiring DM


6.  RA


7.  Hypothyroidism


8.  Anxiety/Depression





PLAN:


1.  No absolute contraindication for surgery in view of absence of ischemic 

symptoms, decompensated congestive heart failure or malignant arrhythmia


2.  Continue Metoprolol ER but 25 mg QD and Lisinopril 2.5 mg QD as tolerated


3.  DM management to be adjusted 


4.  Thyroid replacement therapy


5.  Monitor electrolytes and correct NA


6.  Surgery pending COVD testing





Cardiologist: Priyank Reynoso MD ([facility])


To follow up with Dr. Reynoso upon discharge after surgery


Thank you for your consultation request





Jason Gibbs MD

## 2020-08-11 NOTE — CONSULT
Consult - text type





- Consultation


Consultation Note: 








NEUROLOGY CONSULTATION is greatly appreciated:





Events reviewed. Patient examined at 9 AM today.


This 55 yo RH m woman with a 20 yo daughter has a long h/o DM, hypothyroidism, 

HTN and depression.


Maintained on: Abilify; Bupropion 150; Insulin (LOG) via pump; Methotrexate; 

Metoprolol 50; lisinopril; Ozempic; Sertraline 100; Simvastatin; Trazodone 75.


Admitted after after syncope proceeded by clear prodrome of lightheadedness, 

graying of vision -> LOC with left trimaleolar fracture.


Pt reports multiple episodes of lightheadedness when getting up from bed or 

chair x 1 month (since starting Ozempic).


> 5 years of numbness in feet affecting balance





AMARJIT: No head trauma. Left leg in splint. No bruits. Cor reg.


NEURO: Awake, alert, Ox 3. MS/speech: Normal


           CN II-XII: Normal without nystagmus


           Motor: Normal strength. Areflexic in legs. Toes downgoing


           Coord: No FTN Dystaxia


           Sensory: Reduced vibration to ankles





IMP: Non-focal exam sig for a moderate Diabetic peripheral neuropathy.


       Syncope/presyncope due to orthostatic hypotension- possibly suggesting 

the development of Dysautonomia associated with the neuropathy





SUGGEST: Taper and D/C metoprolol.


               Reduce lisinopril if possible (Patient is concerned about losing 

renal protection)


               Follow orthostatic BP's.


               Neuro f/u for Midodrine Rx if symptoms persist.





Thank you very much,


Florentin Aguilera MD

## 2020-08-12 LAB
ALBUMIN SERPL-MCNC: 2.8 G/DL (ref 3.4–5)
ALP SERPL-CCNC: 52 U/L (ref 45–117)
ALT SERPL-CCNC: 11 U/L (ref 13–61)
ANION GAP SERPL CALC-SCNC: 9 MMOL/L (ref 8–16)
AST SERPL-CCNC: 19 U/L (ref 15–37)
BASOPHILS # BLD: 0.3 % (ref 0–2)
BILIRUB SERPL-MCNC: 0.4 MG/DL (ref 0.2–1)
BUN SERPL-MCNC: 11 MG/DL (ref 7–18)
CALCIUM SERPL-MCNC: 8.4 MG/DL (ref 8.5–10)
CHLORIDE SERPL-SCNC: 96 MMOL/L (ref 98–107)
CO2 SERPL-SCNC: 26 MMOL/L (ref 21–32)
CREAT SERPL-MCNC: 0.9 MG/DL (ref 0.55–1.3)
DEPRECATED RDW RBC AUTO: 12.4 % (ref 11.6–15.6)
EOSINOPHIL # BLD: 1 % (ref 0–4.5)
GLUCOSE SERPL-MCNC: 227 MG/DL (ref 74–106)
HCT VFR BLD CALC: 25.4 % (ref 32.4–45.2)
HGB BLD-MCNC: 8.5 GM/DL (ref 10.7–15.3)
LYMPHOCYTES # BLD: 6.9 % (ref 8–40)
MAGNESIUM SERPL-MCNC: 1.6 MG/DL (ref 1.8–2.4)
MCH RBC QN AUTO: 32.4 PG (ref 25.7–33.7)
MCHC RBC AUTO-ENTMCNC: 33.3 G/DL (ref 32–36)
MCV RBC: 97.1 FL (ref 80–96)
MONOCYTES # BLD AUTO: 12.6 % (ref 3.8–10.2)
NEUTROPHILS # BLD: 79.2 % (ref 42.8–82.8)
PLATELET # BLD AUTO: 186 K/MM3 (ref 134–434)
PMV BLD: 9.1 FL (ref 7.5–11.1)
POTASSIUM SERPLBLD-SCNC: 4.4 MMOL/L (ref 3.5–5.1)
PROT SERPL-MCNC: 5.3 G/DL (ref 6.4–8.2)
RBC # BLD AUTO: 2.61 M/MM3 (ref 3.6–5.2)
SODIUM SERPL-SCNC: 131 MMOL/L (ref 136–145)
WBC # BLD AUTO: 7.8 K/MM3 (ref 4–10.8)

## 2020-08-12 RX ADMIN — LEVOTHYROXINE SODIUM SCH MCG: 112 TABLET ORAL at 06:12

## 2020-08-12 RX ADMIN — INSULIN ASPART SCH: 100 INJECTION, SOLUTION INTRAVENOUS; SUBCUTANEOUS at 06:12

## 2020-08-12 RX ADMIN — ATORVASTATIN CALCIUM SCH MG: 20 TABLET, FILM COATED ORAL at 21:26

## 2020-08-12 RX ADMIN — CEFAZOLIN SODIUM SCH GM: 2 SOLUTION INTRAVENOUS at 14:26

## 2020-08-12 RX ADMIN — TRAZODONE HYDROCHLORIDE SCH MG: 50 TABLET ORAL at 21:26

## 2020-08-12 RX ADMIN — INSULIN ASPART SCH: 100 INJECTION, SOLUTION INTRAVENOUS; SUBCUTANEOUS at 17:01

## 2020-08-12 RX ADMIN — ACETAMINOPHEN PRN MG: 325 TABLET ORAL at 19:59

## 2020-08-12 RX ADMIN — FOLIC ACID SCH MG: 1 TABLET ORAL at 09:34

## 2020-08-12 RX ADMIN — INSULIN ASPART SCH: 100 INJECTION, SOLUTION INTRAVENOUS; SUBCUTANEOUS at 21:24

## 2020-08-12 RX ADMIN — CEFAZOLIN SODIUM SCH GM: 2 SOLUTION INTRAVENOUS at 06:12

## 2020-08-12 RX ADMIN — SERTRALINE HYDROCHLORIDE SCH MG: 50 TABLET ORAL at 21:25

## 2020-08-12 RX ADMIN — LISINOPRIL SCH MG: 5 TABLET ORAL at 09:31

## 2020-08-12 RX ADMIN — INSULIN ASPART SCH: 100 INJECTION, SOLUTION INTRAVENOUS; SUBCUTANEOUS at 11:48

## 2020-08-12 NOTE — PN
Progress Note, Physician


History of Present Illness: 





POD #1 s/p ORIF of left bimalleolar ankle fracture and syndesmosis repair under 

GA and peripheral nerve blocks. Pain adequately controlled. Described positional

syncope with prodrome of light-headedness.





- Current Medication List


Current Medications: 


Active Medications





Acetaminophen (Tylenol -)  650 mg PO Q6H PRN


   PRN Reason: PAIN LEVEL 1-5


Aripiprazole (Abilify)  5 mg PO DAILY [facility]


   Last Admin: 08/12/20 09:32 Dose:  5 mg


   Documented by: 


Atorvastatin Calcium (Lipitor -)  20 mg PO [facility]


   Last Admin: 08/11/20 21:40 Dose:  20 mg


   Documented by: 


Bupropion HCl (Wellbutrin Xl -)  150 mg PO [facility]


   Last Admin: 08/11/20 21:41 Dose:  150 mg


   Documented by: 


Cefazolin Sodium/Dextrose (Ancef 2 Gm Premixed Ivpb -)  2 gm IVPB Q8H [facility]


   Stop: 08/12/20 22:59


   Last Admin: 08/12/20 14:26 Dose:  2 gm


   Documented by: 


Dextrose (D50w (Vial) -)  25 gm IVPUSH PRN PRN


   PRN Reason: HYPOGLYCEMIA


Folic Acid (Folic Acid -)  1 mg PO DAILY [facility]


   Last Admin: 08/12/20 09:34 Dose:  1 mg


   Documented by: 


Lactated Ringer's (Lactated Ringers Solution)  1,000 mls @ 75 mls/hr IV ASDIR 

[facility]


Insulin Aspart (Novolog Vial Sliding Scale -)  1 vial SQ [facility]; Protocol


   Last Admin: 08/12/20 11:48 Dose:  Not Given


   Documented by: 


Levothyroxine Sodium (Synthroid -)  112 mcg PO ACBK [facility]


   Last Admin: 08/12/20 06:12 Dose:  112 mcg


   Documented by: 


Lisinopril (Prinivil)  2.5 mg PO DAILY [facility]


   Last Admin: 08/12/20 09:31 Dose:  2.5 mg


   Documented by: 


Metoprolol Succinate (Toprol Xl -)  25 mg PO DAILY [facility]


   Last Admin: 08/12/20 09:37 Dose:  Not Given


   Documented by: 


Ondansetron HCl (Zofran Injection)  4 mg IVPUSH Q6H PRN


   PRN Reason: NAUSEA AND/OR VOMITING


Oxycodone HCl (Roxicodone -)  5 mg PO Q4H PRN


   PRN Reason: PAIN LEVEL 1-5


Sertraline HCl (Zoloft -)  200 mg PO [facility]


   Last Admin: 08/11/20 21:41 Dose:  200 mg


   Documented by: 


Trazodone HCl (Desyrel -)  75 mg PO [facility]


   Last Admin: 08/11/20 21:40 Dose:  75 mg


   Documented by: 











- Objective


Vital Signs: 


                                   Vital Signs











Temperature  99.3 F   08/12/20 14:07


 


Pulse Rate  101 H  08/12/20 15:00


 


Respiratory Rate  18   08/12/20 14:07


 


Blood Pressure  105/52 L  08/12/20 15:00


 


O2 Sat by Pulse Oximetry (%)  95   08/12/20 14:07











Constitutional: Yes: No Distress, Calm


Neck: Yes: Supple


Cardiovascular: Yes: Tachycardia


Respiratory: Yes: Regular, CTA Bilaterally


Gastrointestinal: Yes: Soft, Hypoactive Bowel Sounds


Edema: No


Wound/Incision: Yes: Dressing Dry and Intact


Labs: 


                                    CBC, BMP





                                 08/12/20 07:06 





                                 08/12/20 07:06 





                                    INR, PTT











INR  1.17  (0.82-1.09)   08/10/20  06:00    














Problem List





- Problems


(1) Bimalleolar fracture of left ankle


Code(s): S82.842A - DISPLACED BIMALLEOLAR FRACTURE OF LEFT LOWER LEG, INIT   


Qualifiers: 


   Encounter type: subsequent encounter   Fracture type: closed   Fracture 

healing: with routine healing   Qualified Code(s): S82.842D - Displaced 

bimalleolar fracture of left lower leg, subsequent encounter for closed fracture

with routine healing   





(2) Diabetes mellitus


Code(s): E11.9 - TYPE 2 DIABETES MELLITUS WITHOUT COMPLICATIONS   


Qualifiers: 


   Diabetes mellitus type: type 1   Diabetes mellitus complication status: 

without complication   Qualified Code(s): E10.9 - Type 1 diabetes mellitus 

without complications   





(3) HTN (hypertension)


Code(s): I10 - ESSENTIAL (PRIMARY) HYPERTENSION   


Qualifiers: 


   Hypertension type: essential hypertension   Qualified Code(s): I10 - 

Essential (primary) hypertension   





(4) Hypothyroidism


Code(s): E03.9 - HYPOTHYROIDISM, UNSPECIFIED   


Qualifiers: 


   Hypothyroidism type: unspecified   Qualified Code(s): E03.9 - Hypothyroidism,

unspecified   





(5) Rheumatoid arthritis


Code(s): M06.9 - RHEUMATOID ARTHRITIS, UNSPECIFIED   





(6) Syncope


Code(s): R55 - SYNCOPE AND COLLAPSE   


Qualifiers: 


   Syncope type: unspecified   Qualified Code(s): R55 - Syncope and collapse   





Assessment/Plan


2-D echocardiogram3/11/20normal RV and LV size and RV and LV systolic 

function LVEF 65%. Mild mitral annular calcification. Mildly Sclerotic calcific

aortic valve with preserved opening. Mild mitral regurgitation. Mild tricuspid 

and trace pulmonic regurgitation with normal PAP 24mmHG. . 





1.  s/p ORIF of left bimalleolar ankle fracture and syndesmosis repair under GA 

and peripheral nerve blocks w/o CV sequelae


2.  Orthostatic syncope


3.  Hyponatremia


4.  HTN


5.  Insulin requiring DM Ha1c 7.4%


6.  RA on MTX wkly


7.  Hypothyroidism


8.  Anxiety/Depression


9. Anemia





PLAN:





1.  Continue Metoprolol ER 25 mg QD, Lisinopril 2.5 mg QD and Lipitor 20 qd as 

tolerated


2.  DM management to be adjusted 


3.  Thyroid replacement therapy


4.  Monitor electrolytes and correct NA


5.  Empiric post-op abx, analgesia as needed


6. Advised to change position slowly and counselled on abortive maneuvers once 

prodromal symptoms experienced





Cardiologist: Priyank Reynoso MD ([facility])


To follow up with Dr. Reynoso upon discharge after surgery

## 2020-08-12 NOTE — PN
Physical Exam: 


SUBJECTIVE: Patient seen and examined at bedside. Has felt nauseous since waking

up this morning, did not want breakfast or lunch. Explained need for PO intake 

or resumption of IV fluids. Denies pain.








OBJECTIVE:





                                   Vital Signs











 Period  Temp  Pulse  Resp  BP Sys/Elias  Pulse Ox


 


 Last 24 Hr  98.4 F-100.4 F    14-18  108-162/50-64  











GENERAL: The patient is awake, alert, and fully oriented. Pale, anxious.


LUNGS: Breath sounds equal, clear to auscultation bilaterally, no wheezes, no 

crackles, no 


accessory muscle use. 


HEART: Regular rate and rhythm, S1, S2 


ABDOMEN: Soft, nontender, nondistended


LLE: Wrapped from toes to knee, c/d/i. Tips of toes are warm, well-perfused, 

+flex/extend, +sensory  


NEUROLOGICAL: Cranial nerves II through XII grossly intact. Normal speech














                         Laboratory Results - last 24 hr











  08/11/20 08/11/20 08/12/20





  14:48 18:09 05:23


 


WBC   


 


RBC   


 


Hgb   


 


Hct   


 


MCV   


 


MCH   


 


MCHC   


 


RDW   


 


Plt Count   


 


MPV   


 


Absolute Neuts (auto)   


 


Neutrophils %   


 


Lymphocytes %   


 


Monocytes %   


 


Eosinophils %   


 


Basophils %   


 


Sodium   


 


Potassium   


 


Chloride   


 


Carbon Dioxide   


 


Anion Gap   


 


BUN   


 


Creatinine   


 


Est GFR (CKD-EPI)AfAm   


 


Est GFR (CKD-EPI)NonAf   


 


POC Glucometer  126  67  295


 


Random Glucose   


 


Calcium   


 


Magnesium   


 


Total Bilirubin   


 


AST   


 


ALT   


 


Alkaline Phosphatase   


 


Total Protein   


 


Albumin   














  08/12/20 08/12/20 08/12/20





  07:06 07:06 11:43


 


WBC  7.8  


 


RBC  2.61 L  


 


Hgb  8.5 L  


 


Hct  25.4 L  


 


MCV  97.1 H  


 


MCH  32.4  


 


MCHC  33.3  


 


RDW  12.4  


 


Plt Count  186  


 


MPV  9.1  


 


Absolute Neuts (auto)  6.2  


 


Neutrophils %  79.2  


 


Lymphocytes %  6.9 L  


 


Monocytes %  12.6 H  


 


Eosinophils %  1.0  


 


Basophils %  0.3  


 


Sodium   131 L 


 


Potassium   4.4 


 


Chloride   96 L 


 


Carbon Dioxide   26 


 


Anion Gap   9 


 


BUN   11.0 


 


Creatinine   0.9 


 


Est GFR (CKD-EPI)AfAm   82.84 


 


Est GFR (CKD-EPI)NonAf   71.48 


 


POC Glucometer    202


 


Random Glucose   227 H 


 


Calcium   8.4 L 


 


Magnesium   1.6 L 


 


Total Bilirubin   0.4 


 


AST   19 


 


ALT   11 L 


 


Alkaline Phosphatase   52 


 


Total Protein   5.3 L 


 


Albumin   2.8 L 








                           Active Medications











Generic Name Dose Route Start Last Admin





  Trade Name Freq  PRN Reason Stop Dose Admin


 


Acetaminophen  650 mg  08/12/20 11:29 





  Tylenol -  PO  





  Q6H PRN  





  PAIN LEVEL 1-5  


 


Aripiprazole  5 mg  08/10/20 10:00  08/12/20 09:32





  Abilify  PO   5 mg





  DAILY MANISH   Administration


 


Atorvastatin Calcium  20 mg  08/10/20 22:00  08/11/20 21:40





  Lipitor -  PO   20 mg





  HS MANISH   Administration


 


Bupropion HCl  150 mg  08/10/20 22:00  08/11/20 21:41





  Wellbutrin Xl -  PO   150 mg





  HS MANISH   Administration


 


Cefazolin Sodium/Dextrose  2 gm  08/11/20 23:00  08/12/20 06:12





  Ancef 2 Gm Premixed Ivpb -  IVPB  08/12/20 22:59  2 gm





  Q8H MANISH   Administration


 


Dextrose  25 gm  08/09/20 18:26 





  D50w (Vial) -  IVPUSH  





  PRN PRN  





  HYPOGLYCEMIA  


 


Folic Acid  1 mg  08/10/20 17:30  08/12/20 09:34





  Folic Acid -  PO   1 mg





  DAILY [facility]   Administration


 


Lactated Ringer's  1,000 mls @ 75 mls/hr  08/11/20 14:45 





  Lactated Ringers Solution  IV  





  ASDIR [facility]  


 


Insulin Aspart  1 vial  08/09/20 22:00  08/12/20 11:48





  Novolog Vial Sliding Scale -  SQ   Not Given





  ACHS [facility]  





  Protocol  


 


Levothyroxine Sodium  112 mcg  08/10/20 07:00  08/12/20 06:12





  Synthroid -  PO   112 mcg





  ACBK [facility]   Administration


 


Lisinopril  2.5 mg  08/10/20 10:00  08/12/20 09:31





  Prinivil  PO   2.5 mg





  DAILY [facility]   Administration


 


Metoprolol Succinate  25 mg  08/12/20 10:00  08/12/20 09:37





  Toprol Xl -  PO   Not Given





  DAILY [facility]  


 


Morphine Sulfate  2 mg  08/09/20 17:55  08/11/20 06:59





  Morphine Sulfate  IVPUSH   2 mg





  Q4H PRN   Administration





  PAIN LEVEL 7 - 10  


 


Ondansetron HCl  4 mg  08/11/20 14:35 





  Zofran Injection  IVPUSH  





  Q6H PRN  





  NAUSEA AND/OR VOMITING  


 


Oxycodone HCl  5 mg  08/11/20 14:35 





  Roxicodone -  PO  





  Q4H PRN  





  PAIN LEVEL 1-5  


 


Sertraline HCl  200 mg  08/09/20 22:00  08/11/20 21:41





  Zoloft -  PO   200 mg





  HS MANISH   Administration


 


Trazodone HCl  75 mg  08/09/20 22:00  08/11/20 21:40





  Desyrel -  PO   75 mg





  HS MANISH   Administration











ASSESSMENT/PLAN:


The patient is a 54 year-old female with a PMH significant for HTN, cardiac 

arrhythmia, Type II IDDM, hypothyroidism, rheumatoid arthritis, remote head 

trauma, anxiety and depression. Admitted for left bimalleolar fracture s/p fall.







Syncope


h/o cardiac arrhythmia


   --telemetry monitoring: no events


   --cardiology: decrease ToprolXL dose to 25mg


   --seen and evaluated by neuro: recommends tapering beta blocker to off; neuro

followup for midodrine if symptoms persist





Left displaced bimalleolar fracture


   --s/p ORIF of left bimalleolar ankle fracture and syndesmosis repair


   --nauseated; avoid narcotics; Zofran PRN; scale back diet to full liquids


         


Hypertension


   --+orthostatic this afternoon; patient does not want IV reconnected, 

encourage PO intake if, if not improved will need to restart fluids 





Type II IDDM


   --HgbA1C 7.4


   --functioning Humalog insulin pump


   --nursing staff to continue fingersticks and to cover with Novolog per 

sliding scale


   


Hypothyroidism


   --TSH wnl


   --continue levothyroxine





Rheumatoid arthritis


   --methotrexate weekly; daily folic acid





Anxiety/depression


   --continue Abilify, Wellbutrin, trazodone





Hypomagnesemia


   --repleted





FEN


   Fluids: PO intake adequate


   Electrolytes: replete as indicated


   Nutrition: full liquid diabetic 





DVT prophylaxis: SCDs





Dispo: continues to require inpatient care. Full code.














Visit type





- Emergency Visit


Emergency Visit: Yes


ED Registration Date: 08/09/20


Care time: The patient presented to the Emergency Department on the above date 

and was hospitalized for further evaluation of their emergent condition.





- New Patient


This patient is new to me today: No





- Critical Care


Critical Care patient: No

## 2020-08-12 NOTE — PN
Progress Note (short form)





- Note


Progress Note: 





Ortho





Pt seen and examined s/p left ankle ORIF pod #1


                                Selected Entries











  08/12/20





  09:00


 


Temperature 99.6 F


 


Pulse Rate 110 H


 


Respiratory 18





Rate 


 


Blood Pressure 112/53 L








                                Laboratory Tests











  08/12/20





  07:06


 


WBC  7.8


 


Hgb  8.5 L


 


Hct  25.4 L


 


Plt Count  186








splint c/d/i, able to move toes, decr sensation (h/o diabetic neuropathy)





a/p


NWB


PT eval walker/crutch training


pain control


d/c planning

## 2020-08-12 NOTE — OP
DATE OF OPERATION:  08/11/2020

 

PREOPERATIVE DIAGNOSIS:  Left displaced bimalleolar ankle fracture.

 

POSTOPERATIVE DIAGNOSIS:  Left displaced bimalleolar ankle fracture.

 

PROCEDURE:  Open reduction and internal fixation of left bimalleolar ankle fracture

and syndesmosis repair.

 

SURGICAL ATTENDING:  Florentin Rebolledo MD

 

FIRST ASSISTANT:  LATOYA Orr

 

ANESTHESIA:  Regional and general.

 

CLOSURE:  A periarticular distal fibula plate laterally with Arthrex syndesmosis

TightRope, multiple cannulated screws medially with washers and with FiberWire

suture, 0 Vicryl fascia, 2-0 subcutaneous and staples skin.

 

ESTIMATED BLOOD LOSS:  Negligible.

 

COMPLICATIONS:  None.

 

CONDITION:  To recovery room in stable condition.

 

DESCRIPTION OF OPERATIVE PROCEDURE:  Patient was taken to the operating room on

August 11, 2020.  Regional and general anesthesia were administered by the

anesthesiologist.  IV Kefzol was administered prophylactically prior to the case. 

Left lower extremity was prepped and draped in the usual sterile fashion.

 

First attention was directed to the lateral aspect of the ankle.  A 10-cm

longitudinal incision over the distal fibula was incised.  Hemostasis achieved with

Bovie cautery.  Sharp dissection was carried full thickness down to the level of the

fracture.  Periosteal elevator and curets were used to free up the fracture and

expose the fracture.  There was significant comminution of the fracture, but an

anatomical reduction was able to be held with reduction clamps.  Due to the

comminution we elected not to try to lag the fracture together.  The periarticular

distal fibula plate was clamped both proximally and distally to the fracture. 

Multiple proximal and distal holes were drilled, depth gauged and screwed with the

appropriate-sized screws.  Initially nonlocking screws were used to cinch the plate

to the bone and then locking screws were applied proximally and distally and then the

nonlocking screws were switched out for locking screws.  The quality of the bone was

very poor and fixation in the screws was tenuous.  Therefore decided to use an

Arthrex syndesmosis TightRope to give adjuvant fixation to the plate using

fluoroscopy and drilling a guidewire through the plate parallel to the ankle joint

from lateral to medial was performed.  This was overreamed 4 cortices with the

cannulated drill and then an Arthrex syndesmosis TightRope was deployed.  The button

was flipped on the anteromedial cortex of the tibia and then the sutures were

toggled, getting an excellent fixation on the plate and the medial tibia, giving

adjuvant fixation to the lateral repair.

 

Next our attention was directed to the medial side.  A 5-cm curvilinear longitudinal

incision over the medial malleolus was then placed.  Hemostasis achieved with Bovie

cautery.  Sharp dissection carried down to the level of the fracture.  Curets and

irrigation were used to free up the fracture.  Anatomic reduction of the fracture was

obtained.  Two guidewires were placed up the medial malleolus and exited the

posterior cortex of the tibia.  Two screws were then drilled.  Excellent reduction

was obtained, although with range of motion of the screws pulled out of the soft bone

in the tibia.  We initially tried to give adjuvant fixation with washers, but that

did not aid our situation.  We then backed out the screws somewhat.  We drilled a

post proximal to the fracture from medial to lateral.  We drilled and placed a

cannulated screw with a washer in the tibia and then we looped in a figure-of-8

formation a No. 2 FiberWire suture underneath all the washers.  We initially did this

anteriorly, but the bone was too soft and the screw migrated.  We then had to do it

more posteriorly and this time we got good fixation or adequate fixation on the screw

and we tied the sutures down snug, then further screwed the screws back into the

bone, cinching the washer flush with the tibia and with the medial malleolus to hold

the tension band figure-of-8 suture.  Again the fixation was less than ideal, but

fluoroscopy in the AP, mortise and lateral views revealed good reduction of the

mortise with good reduction of the medial malleolus.

 

Both incisions were copiously irrigated with antibiotic solution.  Fascia was closed

with 0 Vicryl, 2-0 subcutaneous and staples for skin.  A "U" splint was applied. 

Patient was awakened from anesthesia and transferred to recovery room in stable

condition.  No complications.  Estimated blood loss negligible.

 

 

RIAZ [id number]

DD: 08/11/2020 17:33

DT: 08/12/2020 09:03

Job #:  [id number]

## 2020-08-12 NOTE — PN
Progress Note (short form)





- Note


Progress Note: 





54F POD1 s/p left ankle ORIF under GA and peripheral nerve blocks


Pt states pain is well controlled and reports no anesthetic complications.


AVSS.


Continue current regimen.

## 2020-08-13 VITALS — HEART RATE: 86 BPM | SYSTOLIC BLOOD PRESSURE: 101 MMHG | TEMPERATURE: 98.4 F | DIASTOLIC BLOOD PRESSURE: 51 MMHG

## 2020-08-13 LAB
ALBUMIN SERPL-MCNC: 2.8 G/DL (ref 3.4–5)
ALP SERPL-CCNC: 66 U/L (ref 45–117)
ALT SERPL-CCNC: 10 U/L (ref 13–61)
ANION GAP SERPL CALC-SCNC: 9 MMOL/L (ref 8–16)
AST SERPL-CCNC: 19 U/L (ref 15–37)
BASOPHILS # BLD: 0.4 % (ref 0–2)
BILIRUB SERPL-MCNC: 0.5 MG/DL (ref 0.2–1)
BUN SERPL-MCNC: 9 MG/DL (ref 7–18)
CALCIUM SERPL-MCNC: 8.7 MG/DL (ref 8.5–10)
CHLORIDE SERPL-SCNC: 97 MMOL/L (ref 98–107)
CO2 SERPL-SCNC: 28 MMOL/L (ref 21–32)
CREAT SERPL-MCNC: 0.8 MG/DL (ref 0.55–1.3)
DEPRECATED RDW RBC AUTO: 12.3 % (ref 11.6–15.6)
EOSINOPHIL # BLD: 1 % (ref 0–4.5)
GLUCOSE SERPL-MCNC: 162 MG/DL (ref 74–106)
HCT VFR BLD CALC: 24.4 % (ref 32.4–45.2)
HGB BLD-MCNC: 8.1 GM/DL (ref 10.7–15.3)
LYMPHOCYTES # BLD: 8.1 % (ref 8–40)
MAGNESIUM SERPL-MCNC: 1.5 MG/DL (ref 1.8–2.4)
MCH RBC QN AUTO: 32.2 PG (ref 25.7–33.7)
MCHC RBC AUTO-ENTMCNC: 33.5 G/DL (ref 32–36)
MCV RBC: 96.3 FL (ref 80–96)
MONOCYTES # BLD AUTO: 11.3 % (ref 3.8–10.2)
NEUTROPHILS # BLD: 79.2 % (ref 42.8–82.8)
PLATELET # BLD AUTO: 195 K/MM3 (ref 134–434)
PMV BLD: 9.6 FL (ref 7.5–11.1)
POTASSIUM SERPLBLD-SCNC: 4.3 MMOL/L (ref 3.5–5.1)
PROT SERPL-MCNC: 5.4 G/DL (ref 6.4–8.2)
RBC # BLD AUTO: 2.53 M/MM3 (ref 3.6–5.2)
SODIUM SERPL-SCNC: 134 MMOL/L (ref 136–145)
WBC # BLD AUTO: 9.2 K/MM3 (ref 4–10.8)

## 2020-08-13 RX ADMIN — LEVOTHYROXINE SODIUM SCH MCG: 112 TABLET ORAL at 06:26

## 2020-08-13 RX ADMIN — FOLIC ACID SCH MG: 1 TABLET ORAL at 10:07

## 2020-08-13 RX ADMIN — INSULIN ASPART SCH: 100 INJECTION, SOLUTION INTRAVENOUS; SUBCUTANEOUS at 06:23

## 2020-08-13 RX ADMIN — INSULIN ASPART SCH UNIT: 100 INJECTION, SOLUTION INTRAVENOUS; SUBCUTANEOUS at 12:18

## 2020-08-13 RX ADMIN — INSULIN ASPART SCH: 100 INJECTION, SOLUTION INTRAVENOUS; SUBCUTANEOUS at 18:02

## 2020-08-13 RX ADMIN — ACETAMINOPHEN PRN MG: 325 TABLET ORAL at 10:30

## 2020-08-13 NOTE — PN
Physical Exam: 


SUBJECTIVE: Patient seen and examined








OBJECTIVE:





                                   Vital Signs











 Period  Temp  Pulse  Resp  BP Sys/Elias  Pulse Ox


 


 Last 24 Hr  98.7 F-99.6 F    16-18  /48-83  95-98











GENERAL: The patient is awake, alert, and fully oriented, in no acute distress.


HEAD: Normal with no signs of trauma.


EYES: PERRL, extraocular movements intact, sclera anicteric, conjunctiva clear. 

No ptosis. 


ENT: Ears normal, nares patent, oropharynx clear without exudates, moist mucous 


membranes.


NECK: Trachea midline, full range of motion, supple. 


LUNGS: Breath sounds equal, clear to auscultation bilaterally, no wheezes, no 

crackles, no 


accessory muscle use. 


HEART: Regular rate and rhythm, S1, S2 without murmur, rub or gallop.


ABDOMEN: Soft, nontender, nondistended, normoactive bowel sounds, no guarding, 

no 


rebound, no hepatosplenomegaly, no masses.


EXTREMITIES: 2+ pulses, warm, well-perfused, no edema. 


NEUROLOGICAL: Cranial nerves II through XII grossly intact. Normal speech, gait 

not 


observed.


PSYCH: Normal mood, normal affect.


SKIN: Warm, dry, normal turgor, no rashes or lesions noted














                         Laboratory Results - last 24 hr











  08/12/20 08/12/20 08/12/20





  07:06 07:06 11:43


 


WBC  7.8  


 


RBC  2.61 L  


 


Hgb  8.5 L  


 


Hct  25.4 L  


 


MCV  97.1 H  


 


MCH  32.4  


 


MCHC  33.3  


 


RDW  12.4  


 


Plt Count  186  


 


MPV  9.1  


 


Absolute Neuts (auto)  6.2  


 


Neutrophils %  79.2  


 


Lymphocytes %  6.9 L  


 


Monocytes %  12.6 H  


 


Eosinophils %  1.0  


 


Basophils %  0.3  


 


Sodium   131 L 


 


Potassium   4.4 


 


Chloride   96 L 


 


Carbon Dioxide   26 


 


Anion Gap   9 


 


BUN   11.0 


 


Creatinine   0.9 


 


Est GFR (CKD-EPI)AfAm   82.84 


 


Est GFR (CKD-EPI)NonAf   71.48 


 


POC Glucometer    202


 


Random Glucose   227 H 


 


Calcium   8.4 L 


 


Magnesium   1.6 L 


 


Total Bilirubin   0.4 


 


AST   19 


 


ALT   11 L 


 


Alkaline Phosphatase   52 


 


Total Protein   5.3 L 


 


Albumin   2.8 L 














  08/12/20 08/12/20 08/13/20





  16:42 21:15 06:19


 


WBC   


 


RBC   


 


Hgb   


 


Hct   


 


MCV   


 


MCH   


 


MCHC   


 


RDW   


 


Plt Count   


 


MPV   


 


Absolute Neuts (auto)   


 


Neutrophils %   


 


Lymphocytes %   


 


Monocytes %   


 


Eosinophils %   


 


Basophils %   


 


Sodium   


 


Potassium   


 


Chloride   


 


Carbon Dioxide   


 


Anion Gap   


 


BUN   


 


Creatinine   


 


Est GFR (CKD-EPI)AfAm   


 


Est GFR (CKD-EPI)NonAf   


 


POC Glucometer  209  181  155


 


Random Glucose   


 


Calcium   


 


Magnesium   


 


Total Bilirubin   


 


AST   


 


ALT   


 


Alkaline Phosphatase   


 


Total Protein   


 


Albumin   








Active Medications











Generic Name Dose Route Start Last Admin





  Trade Name Freq  PRN Reason Stop Dose Admin


 


Acetaminophen  650 mg  08/12/20 11:29  08/12/20 19:59





  Tylenol -  PO   650 mg





  Q6H PRN   Administration





  PAIN LEVEL 1-5  


 


Aripiprazole  5 mg  08/10/20 10:00  08/12/20 09:32





  Abilify  PO   5 mg





  DAILY MANISH   Administration


 


Atorvastatin Calcium  20 mg  08/10/20 22:00  08/12/20 21:26





  Lipitor -  PO   20 mg





  HS MANISH   Administration


 


Bupropion HCl  150 mg  08/10/20 22:00  08/12/20 21:26





  Wellbutrin Xl -  PO   150 mg





  HS MANISH   Administration


 


Dextrose  25 gm  08/09/20 18:26 





  D50w (Vial) -  IVPUSH  





  PRN PRN  





  HYPOGLYCEMIA  


 


Folic Acid  1 mg  08/10/20 17:30  08/12/20 09:34





  Folic Acid -  PO   1 mg





  DAILY MANISH   Administration


 


Lactated Ringer's  1,000 mls @ 75 mls/hr  08/11/20 14:45  08/12/20 17:35





  Lactated Ringers Solution  IV   Not Given





  ASDIR MANISH  


 


Insulin Aspart  1 vial  08/09/20 22:00  08/13/20 06:23





  Novolog Vial Sliding Scale -  SQ   Not Given





  ACHS [facility]  





  Protocol  


 


Levothyroxine Sodium  112 mcg  08/10/20 07:00  08/13/20 06:26





  Synthroid -  PO   112 mcg





  ACBK MANISH   Administration


 


Lisinopril  2.5 mg  08/10/20 10:00  08/12/20 09:31





  Prinivil  PO   2.5 mg





  DAILY MANISH   Administration


 


Metoprolol Succinate  25 mg  08/12/20 17:15  08/12/20 17:35





  Toprol Xl -  PO   25 mg





  DAILY MANISH   Administration


 


Ondansetron HCl  4 mg  08/11/20 14:35 





  Zofran Injection  IVPUSH  





  Q6H PRN  





  NAUSEA AND/OR VOMITING  


 


Oxycodone HCl  5 mg  08/11/20 14:35  08/13/20 06:25





  Roxicodone -  PO   5 mg





  Q4H PRN   Administration





  PAIN LEVEL 1-5  


 


Sertraline HCl  200 mg  08/09/20 22:00  08/12/20 21:25





  Zoloft -  PO   200 mg





  HS MANISH   Administration


 


Trazodone HCl  75 mg  08/09/20 22:00  08/12/20 21:26





  Desyrel -  PO   75 mg





  HS MANISH   Administration











ASSESSMENT/PLAN:


BP low, hold lisinopril





repleted Mg

## 2020-08-13 NOTE — PN
Progress Note (short form)





- Note


Progress Note: 





Ortho





Pt seen and examined s/p left ankle ORIF pod #2


                                Selected Entries











  08/13/20





  06:00


 


Temperature 98.9 F


 


Pulse Rate 95 H


 


Respiratory 18





Rate 


 


Blood Pressure 113/52 L








                                Laboratory Tests











  08/13/20





  06:57


 


WBC  Pending


 


Hgb  Pending


 


Hct  Pending


 


Plt Count  Pending











splint c/d/i, able to move toes, nvi





a/p


NWB


PT eval walker/crutch training


pain control


d/c planning

## 2020-08-13 NOTE — PN
Progress Note, Physician


History of Present Illness: 





POD #2 s/p ORIF of left bimalleolar ankle fracture and syndesmosis repair under 

GA and peripheral nerve blocks. Pain adequately controlled. Described positional

syncope with prodrome of light-headedness, lisinopril held, Toprol decreased.





- Current Medication List


Current Medications: 


Active Medications





Acetaminophen (Tylenol -)  650 mg PO Q6H PRN


   PRN Reason: PAIN LEVEL 1-5


   Last Admin: 08/12/20 19:59 Dose:  650 mg


   Documented by: 


Aripiprazole (Abilify)  5 mg PO DAILY [facility]


   Last Admin: 08/12/20 09:32 Dose:  5 mg


   Documented by: 


Atorvastatin Calcium (Lipitor -)  20 mg PO HS [facility]


   Last Admin: 08/12/20 21:26 Dose:  20 mg


   Documented by: 


Bupropion HCl (Wellbutrin Xl -)  150 mg PO [facility]


   Last Admin: 08/12/20 21:26 Dose:  150 mg


   Documented by: 


Dextrose (D50w (Vial) -)  25 gm IVPUSH PRN PRN


   PRN Reason: HYPOGLYCEMIA


Folic Acid (Folic Acid -)  1 mg PO DAILY [facility]


   Last Admin: 08/12/20 09:34 Dose:  1 mg


   Documented by: 


Lactated Ringer's (Lactated Ringers Solution)  1,000 mls @ 75 mls/hr IV ASDIR 

[facility]


   Last Admin: 08/12/20 17:35 Dose:  Not Given


   Documented by: 


Insulin Aspart (Novolog Vial Sliding Scale -)  [facility]; Protocol


   Last Admin: 08/13/20 06:23 Dose:  Not Given


   Documented by: 


Levothyroxine Sodium (Synthroid -)  112 mcg PO ACBK [facility]


   Last Admin: 08/13/20 06:26 Dose:  112 mcg


   Documented by: 


Lisinopril (Prinivil)  2.5 mg PO DAILY [facility]


   Last Admin: 08/12/20 09:31 Dose:  2.5 mg


   Documented by: 


Metoprolol Succinate (Toprol Xl -)  25 mg PO DAILY [facility]


   Last Admin: 08/12/20 17:35 Dose:  25 mg


   Documented by: 


Ondansetron HCl (Zofran Injection)  4 mg IVPUSH Q6H PRN


   PRN Reason: NAUSEA AND/OR VOMITING


Oxycodone HCl (Roxicodone -)  5 mg PO Q4H PRN


   PRN Reason: PAIN LEVEL 1-5


   Last Admin: 08/13/20 06:25 Dose:  5 mg


   Documented by: 


Sertraline HCl (Zoloft -)  200 mg PO [facility]


   Last Admin: 08/12/20 21:25 Dose:  200 mg


   Documented by: 


Trazodone HCl (Desyrel -)  75 mg PO [facility]


   Last Admin: 08/12/20 21:26 Dose:  75 mg


   Documented by: 











- Objective


Vital Signs: 


                                   Vital Signs











Temperature  98.9 F   08/13/20 06:00


 


Pulse Rate  96 H  08/13/20 08:18


 


Respiratory Rate  18   08/13/20 06:00


 


Blood Pressure  127/54 L  08/13/20 08:18


 


O2 Sat by Pulse Oximetry (%)  97   08/13/20 06:00











Constitutional: Yes: No Distress, Calm


Neck: Yes: Supple


Cardiovascular: Yes: Regular Rate and Rhythm


Respiratory: Yes: Regular, CTA Bilaterally


Gastrointestinal: Yes: Normal Bowel Sounds, Soft


Edema: No


Wound/Incision: Yes: Dressing Dry and Intact


Labs: 


                                    CBC, BMP





                                 08/13/20 06:57 





                                 08/13/20 06:57 





                                    INR, PTT











INR  1.17  (0.82-1.09)   08/10/20  06:00    














Problem List





- Problems


(1) Bimalleolar fracture of left ankle


Code(s): S82.842A - DISPLACED BIMALLEOLAR FRACTURE OF LEFT LOWER LEG, INIT   


Qualifiers: 


   Encounter type: subsequent encounter   Fracture type: closed   Fracture 

healing: with routine healing   Qualified Code(s): S82.842D - Displaced 

bimalleolar fracture of left lower leg, subsequent encounter for closed fracture

with routine healing   





(2) Diabetes mellitus


Code(s): E11.9 - TYPE 2 DIABETES MELLITUS WITHOUT COMPLICATIONS   


Qualifiers: 


   Diabetes mellitus type: type 1   Diabetes mellitus complication status: 

without complication   Qualified Code(s): E10.9 - Type 1 diabetes mellitus 

without complications   





(3) HTN (hypertension)


Code(s): I10 - ESSENTIAL (PRIMARY) HYPERTENSION   


Qualifiers: 


   Hypertension type: essential hypertension   Qualified Code(s): I10 - 

Essential (primary) hypertension   





(4) Hypothyroidism


Code(s): E03.9 - HYPOTHYROIDISM, UNSPECIFIED   


Qualifiers: 


   Hypothyroidism type: unspecified   Qualified Code(s): E03.9 - Hypothyroidism,

unspecified   





(5) Rheumatoid arthritis


Code(s): M06.9 - RHEUMATOID ARTHRITIS, UNSPECIFIED   





(6) Syncope


Code(s): R55 - SYNCOPE AND COLLAPSE   


Qualifiers: 


   Syncope type: unspecified   Qualified Code(s): R55 - Syncope and collapse   





Assessment/Plan


2-D echocardiogram3/11/20normal RV and LV size and RV and LV systolic 

function LVEF 65%. Mild mitral annular calcification. Mildly Sclerotic calcific

aortic valve with preserved opening. Mild mitral regurgitation. Mild tricuspid 

and trace pulmonic regurgitation with normal PAP 24mmHG. . 





1.  s/p ORIF of left bimalleolar ankle fracture and syndesmosis repair under GA 

and peripheral nerve blocks w/o CV sequelae


2.  Orthostatic syncope


3.  Hyponatremia


4.  HTN


5.  Insulin requiring DM Ha1c 7.4%


6.  RA on MTX wkly


7.  Hypothyroidism


8.  Anxiety/Depression


9. Anemia





PLAN:





1.  Continue Metoprolol ER 25 mg QD, resume Lisinopril 2.5 mg QD and Lipitor 20 

qd as hemodynamics tolerate


2.  DM management to be adjusted 


3.  Thyroid replacement therapy


4.  Monitor electrolytes and correct NA


5.  Empiric post-op abx, analgesia as needed


6. Advised to change position slowly and counselled on abortive maneuvers once 

prodromal symptoms experienced





Cardiologist: Priyank Reynoso MD ([facility])


To follow up with Dr. Reynoso upon discharge after surgery

## 2020-08-13 NOTE — DS
Physical Exam: 


SUBJECTIVE: Patient seen and examined








OBJECTIVE:





                                   Vital Signs











 Period  Temp  Pulse  Resp  BP Sys/Elias  Pulse Ox


 


 Last 24 Hr  98.4 F-99.1 F    16-18  /48-60  96-99








PHYSICAL EXAM





GENERAL: The patient is awake, alert, and fully oriented, in no acute distress.


HEAD: Normal with no signs of trauma.


EYES: PERRL, extraocular movements intact, sclera anicteric, conjunctiva clear. 


ENT: Ears normal, nares patent, oropharynx clear without exudates, moist mucous 

membranes.


NECK: Trachea midline, full range of motion, supple. 


LUNGS: Breath sounds equal, clear to auscultation bilaterally, no wheezes, no 

crackles, no accessory muscle use. 


HEART: Regular rate and rhythm, S1, S2 without murmur, rub or gallop.


ABDOMEN: Soft, nontender, nondistended, normoactive bowel sounds, no guarding, 

no rebound, no hepatosplenomegaly, no masses.


EXTREMITIES: 2+ pulses, warm, well-perfused, no edema. 


NEUROLOGICAL: Cranial nerves II through XII grossly intact. Normal speech, gait 

not observed.


PSYCH: Normal mood, normal affect.


SKIN: Warm, dry, normal turgor, no rashes or lesions noted.





LABS


                         Laboratory Results - last 24 hr











  08/12/20 08/12/20 08/13/20





  16:42 21:15 06:19


 


WBC   


 


RBC   


 


Hgb   


 


Hct   


 


MCV   


 


MCH   


 


MCHC   


 


RDW   


 


Plt Count   


 


MPV   


 


Absolute Neuts (auto)   


 


Neutrophils %   


 


Lymphocytes %   


 


Monocytes %   


 


Eosinophils %   


 


Basophils %   


 


Sodium   


 


Potassium   


 


Chloride   


 


Carbon Dioxide   


 


Anion Gap   


 


BUN   


 


Creatinine   


 


Est GFR (CKD-EPI)AfAm   


 


Est GFR (CKD-EPI)NonAf   


 


POC Glucometer  209  181  155


 


Random Glucose   


 


Calcium   


 


Magnesium   


 


Total Bilirubin   


 


AST   


 


ALT   


 


Alkaline Phosphatase   


 


Total Protein   


 


Albumin   














  08/13/20 08/13/20 08/13/20





  06:57 06:57 11:56


 


WBC  9.2  


 


RBC  2.53 L  


 


Hgb  8.1 L  


 


Hct  24.4 L  


 


MCV  96.3 H  


 


MCH  32.2  


 


MCHC  33.5  


 


RDW  12.3  


 


Plt Count  195  


 


MPV  9.6  


 


Absolute Neuts (auto)  7.4  


 


Neutrophils %  79.2  


 


Lymphocytes %  8.1  


 


Monocytes %  11.3 H  


 


Eosinophils %  1.0  


 


Basophils %  0.4  


 


Sodium   134 L 


 


Potassium   4.3 


 


Chloride   97 L 


 


Carbon Dioxide   28 


 


Anion Gap   9 


 


BUN   9.0 


 


Creatinine   0.8 


 


Est GFR (CKD-EPI)AfAm   95.52 


 


Est GFR (CKD-EPI)NonAf   82.42 


 


POC Glucometer    163


 


Random Glucose   162 H 


 


Calcium   8.7 


 


Magnesium   1.5 L 


 


Total Bilirubin   0.5 


 


AST   19 


 


ALT   10 L 


 


Alkaline Phosphatase   66  D 


 


Total Protein   5.4 L 


 


Albumin   2.8 L 











HOSPITAL COURSE:





Date of Admission:08/09/20





Date of Discharge: 08/13/20





BP low, hold lisinopril





repleted Mg





The patient is a 54 year-old female with a PMH significant for HTN, cardiac 

arrhythmia, Type II IDDM, hypothyroidism, rheumatoid arthritis, remote head 

trauma, anxiety and depression. Admitted for left bimalleolar fracture s/p fall.







Syncope


   --in 10 days prior to admission had fallen 3 times; (+) prodromal symptoms of

unsteadiness and dizziness immediately prior to each fall; fingersticks 

regularly, no episodes of hypoglycemia over the last 10 days


   --found to be orthostatic, treated with IV fluids


   --telemetry: no events


   --seen and evaluated by cardiology, decreased Toprol XL dose to 25mg


   --troponin neg x 2





Left displaced bimalleolar fracture


   --8/12 ORIF of left bimalleolar ankle fracture and syndesmosis repair with 

Dr. Rebolledo


   --outpatient f/u 7-10 days


   


         


Type II IDDM


   --HgbA1C 7.4


   --functioning Humalog insulin pump


   --nursing staff to continue fingersticks and to cover with Novolog per 

sliding scale


   


Hypertension


   --low BP during hospitalization; Toprol XL dose decreased and lisinopril was 

held; advised to continue to hold until follows up with PCP





Hypothyroidism


   --TSH wnl


   --continued levothyroxine





Rheumatoid arthritis


   --methotrexate weekly; daily folic acid





Anxiety/depression


   --continued Abilify, Wellbutrin, trazodone























Minutes to complete discharge: 35





Discharge Summary


Problems reviewed: Yes


Reason For Visit: DISPLACED BIMALLEOLAR FRACTURE OF LEFT LOWER LEG,


Current Active Problems





Anxiety (Acute)


Bimalleolar fracture of left ankle (Acute)


Depression (Acute)


Diabetes mellitus (Acute)


HTN (hypertension) (Acute)


Hypothyroidism (Acute)


Preop cardiovascular exam (Acute)


Rheumatoid arthritis (Acute)


Syncope (Acute)








Goals: 


Per Dr Rebolledo patient is Non Weight Bearing.


Call Dr. Rebolledo tomorrow for follow-up [phone]


Condition: Improved





- Instructions


Diet, Activity, Other Instructions: 


You must not put any weight on your left leg. Complete non-weight bearing status

on that leg. Keep the leg elevated. You should follow up with Dr. Rebolledo in 7-10 

days. Call his office to make an appointment.





A prescription has been sent to your pharmacy for ToprolXL 25mg, a lower dose 

than you were taking previously. Please take this lower dose medication as 

prescribed.





It is recommended you STOP taking lisinopril on discharge because of your blood 

pressure. Please discuss this with your PCP or your cardiologist at your 

earliest opportunity. You should see them within 1 week of your discharge.











Referrals: 


Briana Claros MD [Primary Care Provider] - 


Priyank Reynoso [Non Staff, Medical] - 


Disposition: HOME





- Home Medications


Comprehensive Discharge Medication List: 


Ambulatory Orders





Aripiprazole [Abilify] 5 mg PO DAILY 08/09/20 


Aspirin Coated [Ecotrin -] 81 mg PO DAILY 08/09/20 


Bupropion HCl [Wellbutrin Xl -] 150 mg PO HS 08/09/20 


Folic Acid 1 mg PO DAILY 08/09/20 


Insulin (LOG) Aspart [NovoLOG -] 0 units SQ QID 08/09/20 


Insulin Pump [Insulin Pump - (Nf)] 1 each SQ ASDIR 08/09/20 


Levothyroxine [Synthroid -] 112 mcg PO DAILY 08/09/20 


Lisinopril [Zestril] 2.5 mg PO DAILY 08/09/20 


Methotrexate Sodium/Pf [Methotrexate 250 mg/10 ml Vial] 25 mg IJ WEEKLY 08/09/20




Metoprolol Succinate [Toprol Xl] 50 mg PO DAILY 08/09/20 


Semaglutide [Ozempic] 0.5 mg SQ WEEKLY 08/09/20 


Sertraline HCl [Zoloft] 200 mg PO HS 08/09/20 


Simvastatin 40 mg PO HS 08/09/20 


Trazodone HCl 75 mg PO HS 08/09/20 


traZODone HCL [Trazodone HCl] 75 mg PO HS 08/09/20 








This patient is new to me today: No


Emergency Visit: Yes


ED Registration Date: 08/09/20


Care time: The patient presented to the Emergency Department on the above date 

and was hospitalized for further evaluation of their emergent condition.


Critical Care patient: No





- Discharge Referral


Referred to [facility] No

## 2021-10-28 ENCOUNTER — HOSPITAL ENCOUNTER (EMERGENCY)
Dept: HOSPITAL 74 - FER | Age: 56
Discharge: HOME | End: 2021-10-28
Payer: COMMERCIAL

## 2021-10-28 VITALS — TEMPERATURE: 97.8 F | SYSTOLIC BLOOD PRESSURE: 135 MMHG | HEART RATE: 73 BPM | DIASTOLIC BLOOD PRESSURE: 78 MMHG

## 2021-10-28 VITALS — BODY MASS INDEX: 25.2 KG/M2
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
## 2021-10-28 DIAGNOSIS — S61.451A: Primary | ICD-10-CM
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
## 2021-10-28 PROCEDURE — 3E0234Z INTRODUCTION OF SERUM, TOXOID AND VACCINE INTO MUSCLE, PERCUTANEOUS APPROACH: ICD-10-PCS

## 2021-11-24 ENCOUNTER — HOSPITAL ENCOUNTER (EMERGENCY)
Dept: HOSPITAL 74 - FER | Age: 56
Discharge: HOME | End: 2021-11-24
Payer: COMMERCIAL

## 2021-11-24 VITALS — SYSTOLIC BLOOD PRESSURE: 121 MMHG | DIASTOLIC BLOOD PRESSURE: 66 MMHG | TEMPERATURE: 99.2 F | HEART RATE: 78 BPM

## 2021-11-24 VITALS — BODY MASS INDEX: 28.1 KG/M2

## 2021-11-24 DIAGNOSIS — S05.01XA: Primary | ICD-10-CM
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
## 2023-06-09 ENCOUNTER — HOSPITAL ENCOUNTER (EMERGENCY)
Dept: HOSPITAL 74 - FER | Age: 58
Discharge: HOME | End: 2023-06-09
Payer: COMMERCIAL

## 2023-06-09 VITALS
DIASTOLIC BLOOD PRESSURE: 80 MMHG | RESPIRATION RATE: 18 BRPM | TEMPERATURE: 98.8 F | SYSTOLIC BLOOD PRESSURE: 164 MMHG | HEART RATE: 86 BPM

## 2023-06-09 VITALS — BODY MASS INDEX: 32.2 KG/M2

## 2023-06-09 DIAGNOSIS — S01.112A: Primary | ICD-10-CM
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
## 2023-06-09 PROCEDURE — 0HQ1XZZ REPAIR FACE SKIN, EXTERNAL APPROACH: ICD-10-PCS

## 2023-06-16 ENCOUNTER — HOSPITAL ENCOUNTER (EMERGENCY)
Dept: HOSPITAL 74 - FER | Age: 58
Discharge: HOME | End: 2023-06-16
Payer: COMMERCIAL

## 2023-06-16 VITALS
DIASTOLIC BLOOD PRESSURE: 76 MMHG | HEART RATE: 72 BPM | TEMPERATURE: 98.3 F | SYSTOLIC BLOOD PRESSURE: 152 MMHG | RESPIRATION RATE: 20 BRPM

## 2023-06-16 VITALS — BODY MASS INDEX: 31.8 KG/M2

## 2023-06-16 DIAGNOSIS — Z48.02: Primary | ICD-10-CM
